# Patient Record
Sex: FEMALE | Race: WHITE | Employment: OTHER | URBAN - METROPOLITAN AREA
[De-identification: names, ages, dates, MRNs, and addresses within clinical notes are randomized per-mention and may not be internally consistent; named-entity substitution may affect disease eponyms.]

---

## 2017-01-26 ENCOUNTER — APPOINTMENT (OUTPATIENT)
Dept: LAB | Facility: HOSPITAL | Age: 67
End: 2017-01-26
Attending: INTERNAL MEDICINE
Payer: MEDICARE

## 2017-01-26 ENCOUNTER — OFFICE VISIT (OUTPATIENT)
Dept: INTERNAL MEDICINE CLINIC | Facility: CLINIC | Age: 67
End: 2017-01-26

## 2017-01-26 VITALS
BODY MASS INDEX: 27.14 KG/M2 | TEMPERATURE: 98 F | RESPIRATION RATE: 18 BRPM | SYSTOLIC BLOOD PRESSURE: 94 MMHG | WEIGHT: 168.88 LBS | HEART RATE: 71 BPM | DIASTOLIC BLOOD PRESSURE: 54 MMHG | OXYGEN SATURATION: 97 % | HEIGHT: 66 IN

## 2017-01-26 DIAGNOSIS — E78.2 MIXED HYPERLIPIDEMIA: ICD-10-CM

## 2017-01-26 DIAGNOSIS — E78.2 MIXED HYPERLIPIDEMIA: Primary | ICD-10-CM

## 2017-01-26 DIAGNOSIS — K21.00 GASTROESOPHAGEAL REFLUX DISEASE WITH ESOPHAGITIS: ICD-10-CM

## 2017-01-26 DIAGNOSIS — M81.0 OSTEOPOROSIS: ICD-10-CM

## 2017-01-26 LAB
ALBUMIN SERPL BCP-MCNC: 4.4 G/DL (ref 3.5–4.8)
ALBUMIN/GLOB SERPL: 1.4 {RATIO} (ref 1–2)
ALP SERPL-CCNC: 90 U/L (ref 32–100)
ALT SERPL-CCNC: 29 U/L (ref 14–54)
ANION GAP SERPL CALC-SCNC: 8 MMOL/L (ref 0–18)
AST SERPL-CCNC: 25 U/L (ref 15–41)
BILIRUB SERPL-MCNC: 0.6 MG/DL (ref 0.3–1.2)
BUN SERPL-MCNC: 14 MG/DL (ref 8–20)
BUN/CREAT SERPL: 14.6 (ref 10–20)
CALCIUM SERPL-MCNC: 9.6 MG/DL (ref 8.5–10.5)
CHLORIDE SERPL-SCNC: 100 MMOL/L (ref 95–110)
CO2 SERPL-SCNC: 31 MMOL/L (ref 22–32)
CREAT SERPL-MCNC: 0.96 MG/DL (ref 0.5–1.5)
GLOBULIN PLAS-MCNC: 3.1 G/DL (ref 2.5–3.7)
GLUCOSE SERPL-MCNC: 97 MG/DL (ref 70–99)
OSMOLALITY UR CALC.SUM OF ELEC: 288 MOSM/KG (ref 275–295)
POTASSIUM SERPL-SCNC: 4 MMOL/L (ref 3.3–5.1)
PROT SERPL-MCNC: 7.5 G/DL (ref 5.9–8.4)
SODIUM SERPL-SCNC: 139 MMOL/L (ref 136–144)

## 2017-01-26 PROCEDURE — G0463 HOSPITAL OUTPT CLINIC VISIT: HCPCS | Performed by: INTERNAL MEDICINE

## 2017-01-26 PROCEDURE — 36415 COLL VENOUS BLD VENIPUNCTURE: CPT

## 2017-01-26 PROCEDURE — 99214 OFFICE O/P EST MOD 30 MIN: CPT | Performed by: INTERNAL MEDICINE

## 2017-01-26 PROCEDURE — 80053 COMPREHEN METABOLIC PANEL: CPT

## 2017-01-26 RX ORDER — SIMVASTATIN 10 MG
TABLET ORAL
Qty: 90 TABLET | Refills: 1 | Status: SHIPPED | OUTPATIENT
Start: 2017-01-26 | End: 2017-07-27

## 2017-01-26 RX ORDER — OMEPRAZOLE 40 MG/1
CAPSULE, DELAYED RELEASE ORAL
Qty: 90 CAPSULE | Refills: 1 | Status: SHIPPED | OUTPATIENT
Start: 2017-01-26 | End: 2017-07-27

## 2017-01-26 NOTE — PATIENT INSTRUCTIONS
Problem List Items Addressed This Visit        Unprioritized    Esophageal reflux     Continue on omeprazole 40 mg 1 capsule once daily and will follow up. She does not have any symptoms of reflux or difficulty swallowing at this time.   Will consider a ga

## 2017-01-26 NOTE — ASSESSMENT & PLAN NOTE
Lipid panel and liver functions have been stable. Patient has tolerated his simvastatin at 10 mg 1 tablet once daily. Refills on medications have been sent in. Recheck labs today for the liver function tests and will follow-up in about 6 months.

## 2017-01-26 NOTE — ASSESSMENT & PLAN NOTE
Continue on omeprazole 40 mg 1 capsule once daily and will follow up. She does not have any symptoms of reflux or difficulty swallowing at this time. Will consider a gastroenterology evaluation next year.

## 2017-01-26 NOTE — PROGRESS NOTES
HPI:    Patient ID: Lizbeth Riddle is a 73year old female. HPI Comments: Dexa scan pending,pt has not been able to complete as  passed away in November. Hyperlipidemia  This is a chronic problem.  The current episode started more than 1 year ago Allergic/Immunologic: Negative. Neurological: Negative. Negative for focal weakness. Hematological: Negative. Psychiatric/Behavioral: Negative.                Current Outpatient Prescriptions:  simvastatin 10 MG Oral Tab TAKE 1 TABLET BY MOUTH NI Coordination normal.   Skin: No rash noted. No erythema. Psychiatric: She has a normal mood and affect. Her behavior is normal. Thought content normal.   Nursing note and vitals reviewed.              ASSESSMENT/PLAN:   Mixed hyperlipidemia  (primary enco

## 2017-07-11 ENCOUNTER — TELEPHONE (OUTPATIENT)
Dept: FAMILY MEDICINE CLINIC | Facility: CLINIC | Age: 67
End: 2017-07-11

## 2017-07-11 DIAGNOSIS — M81.0 OSTEOPOROSIS, UNSPECIFIED OSTEOPOROSIS TYPE, UNSPECIFIED PATHOLOGICAL FRACTURE PRESENCE: Primary | ICD-10-CM

## 2017-07-11 DIAGNOSIS — Z78.0 MENOPAUSE: ICD-10-CM

## 2017-07-11 NOTE — TELEPHONE ENCOUNTER
Nae Lujan is calling from [facility] state that pt need a new order for for  Dexa state that the DX code that was provided did not pass medicare neccessity

## 2017-07-17 ENCOUNTER — HOSPITAL ENCOUNTER (OUTPATIENT)
Dept: BONE DENSITY | Facility: HOSPITAL | Age: 67
Discharge: HOME | End: 2017-07-17
Attending: INTERNAL MEDICINE
Payer: MEDICARE

## 2017-07-17 ENCOUNTER — APPOINTMENT (OUTPATIENT)
Dept: LAB | Facility: HOSPITAL | Age: 67
End: 2017-07-17
Attending: INTERNAL MEDICINE
Payer: MEDICARE

## 2017-07-17 DIAGNOSIS — E78.2 MIXED HYPERLIPIDEMIA: ICD-10-CM

## 2017-07-17 DIAGNOSIS — Z78.0 MENOPAUSE: ICD-10-CM

## 2017-07-17 DIAGNOSIS — M81.0 OSTEOPOROSIS: ICD-10-CM

## 2017-07-17 LAB
ALBUMIN SERPL BCP-MCNC: 4 G/DL (ref 3.5–4.8)
ALP SERPL-CCNC: 65 U/L (ref 32–100)
ALT SERPL-CCNC: 17 U/L (ref 14–54)
AST SERPL-CCNC: 22 U/L (ref 15–41)
BILIRUB DIRECT SERPL-MCNC: 0.1 MG/DL (ref 0–0.2)
BILIRUB SERPL-MCNC: 0.8 MG/DL (ref 0.3–1.2)
CHOLEST SERPL-MCNC: 145 MG/DL (ref 110–200)
HDLC SERPL-MCNC: 53 MG/DL
LDLC SERPL CALC-MCNC: 77 MG/DL (ref 0–99)
NONHDLC SERPL-MCNC: 92 MG/DL
PROT SERPL-MCNC: 6.7 G/DL (ref 5.9–8.4)
TRIGL SERPL-MCNC: 73 MG/DL (ref 1–149)
TSH SERPL-ACNC: 1.4 UIU/ML (ref 0.45–5.33)

## 2017-07-17 PROCEDURE — 80076 HEPATIC FUNCTION PANEL: CPT

## 2017-07-17 PROCEDURE — 82306 VITAMIN D 25 HYDROXY: CPT

## 2017-07-17 PROCEDURE — 77080 DXA BONE DENSITY AXIAL: CPT | Performed by: INTERNAL MEDICINE

## 2017-07-17 PROCEDURE — 84443 ASSAY THYROID STIM HORMONE: CPT

## 2017-07-17 PROCEDURE — 80061 LIPID PANEL: CPT

## 2017-07-17 PROCEDURE — 36415 COLL VENOUS BLD VENIPUNCTURE: CPT

## 2017-07-19 LAB — 25(OH)D3 SERPL-MCNC: 38 NG/ML

## 2017-07-27 ENCOUNTER — OFFICE VISIT (OUTPATIENT)
Dept: INTERNAL MEDICINE CLINIC | Facility: CLINIC | Age: 67
End: 2017-07-27

## 2017-07-27 VITALS
RESPIRATION RATE: 20 BRPM | SYSTOLIC BLOOD PRESSURE: 110 MMHG | TEMPERATURE: 98 F | DIASTOLIC BLOOD PRESSURE: 69 MMHG | HEIGHT: 66 IN | HEART RATE: 61 BPM | BODY MASS INDEX: 25.02 KG/M2 | WEIGHT: 155.69 LBS

## 2017-07-27 DIAGNOSIS — K21.00 GASTROESOPHAGEAL REFLUX DISEASE WITH ESOPHAGITIS: ICD-10-CM

## 2017-07-27 DIAGNOSIS — M81.0 AGE-RELATED OSTEOPOROSIS WITHOUT CURRENT PATHOLOGICAL FRACTURE: Primary | ICD-10-CM

## 2017-07-27 DIAGNOSIS — E78.2 MIXED HYPERLIPIDEMIA: ICD-10-CM

## 2017-07-27 PROCEDURE — 99214 OFFICE O/P EST MOD 30 MIN: CPT | Performed by: INTERNAL MEDICINE

## 2017-07-27 PROCEDURE — G0463 HOSPITAL OUTPT CLINIC VISIT: HCPCS | Performed by: INTERNAL MEDICINE

## 2017-07-27 RX ORDER — OMEPRAZOLE 40 MG/1
CAPSULE, DELAYED RELEASE ORAL
Qty: 90 CAPSULE | Refills: 1 | Status: SHIPPED | OUTPATIENT
Start: 2017-07-27 | End: 2018-03-05

## 2017-07-27 RX ORDER — SIMVASTATIN 10 MG
TABLET ORAL
Qty: 90 TABLET | Refills: 1 | Status: SHIPPED | OUTPATIENT
Start: 2017-07-27 | End: 2018-03-05

## 2017-07-27 NOTE — ASSESSMENT & PLAN NOTE
DEXA scan shows significant loss of bone in the spine compared to the hips. Vertebral T-scores are at -3.1. This does suggest osteoporosis. Discussed need to start on medications–either a bisphosphonate or Prolia.   Given the history of chronic reflux an

## 2017-07-27 NOTE — ASSESSMENT & PLAN NOTE
Lipid panel has been remained stable. Liver functions recently rechecked has been normal.  Patient has tolerated simvastatin at 10 mg 1 tablet once daily. Continue the same dose of medications and will recheck labs in about 6 months .

## 2017-07-27 NOTE — PATIENT INSTRUCTIONS
Problem List Items Addressed This Visit        Unprioritized    Esophageal reflux     History of chronic gastroesophageal reflux disease which has been stable on omeprazole at 40 mg 1 capsule once daily.   She has had a history of dysphagia due to a Schatzk

## 2017-07-27 NOTE — PROGRESS NOTES
HPI:    Patient ID: Андрей Tristan is a 78year old female. LEFT FEMORAL NECK                         BMD:              0.612 gm/sq.  cm.                      T SCORE:                   -2.1                 Change since previous:            7.6% decrease Known Allergies    Blood pressure 110/69, pulse 61, temperature 97.9 °F (36.6 °C), temperature source Oral, resp. rate 20, height 5' 6\" (1.676 m), weight 155 lb 11.2 oz (70.6 kg), not currently breastfeeding. Body mass index is 25.13 kg/m².    PHYSICAL E gastroesophageal reflux disease which has been stable on omeprazole at 40 mg 1 capsule once daily. She has had a history of dysphagia due to a Schatzki's ring which is status post dilation and has been doing better.   She will be due for a gastroenterology DAY BEFORE A MEAL           Imaging & Referrals:  None       TD#[id number]

## 2017-07-27 NOTE — ASSESSMENT & PLAN NOTE
History of chronic gastroesophageal reflux disease which has been stable on omeprazole at 40 mg 1 capsule once daily. She has had a history of dysphagia due to a Schatzki's ring which is status post dilation and has been doing better.   She will be due for

## 2017-10-24 ENCOUNTER — TELEPHONE (OUTPATIENT)
Dept: INTERNAL MEDICINE CLINIC | Facility: CLINIC | Age: 67
End: 2017-10-24

## 2017-10-24 NOTE — TELEPHONE ENCOUNTER
PATIENT STATES THE OFFICE  WAS SENDING PRIOR AUTHORIZATION TO MEDICARE FOR PROLIA INJECTION. SHE HAS NOT HEARD ANYTHING BACK REGARDING THIS.

## 2017-10-24 NOTE — TELEPHONE ENCOUNTER
Prolia verification forms completed and faxed to Jacki Wallace at [phone]. Response time 1-7 business days.

## 2017-10-31 NOTE — TELEPHONE ENCOUNTER
Prolia Injection Coverage Summary received from [employer]. Pt has Medicare and [employer]. Prolia is covered by Medicare and and part B co-insurance.

## 2017-10-31 NOTE — TELEPHONE ENCOUNTER
Called [facility] and confirmed Prolia is in stock.  Contacted pt, instructed her to call to schedule a nurse visit at [facility]  To receive Prolia In.

## 2017-11-08 ENCOUNTER — NURSE ONLY (OUTPATIENT)
Dept: INTERNAL MEDICINE CLINIC | Facility: CLINIC | Age: 67
End: 2017-11-08

## 2017-11-08 DIAGNOSIS — M81.0 AGE-RELATED OSTEOPOROSIS WITHOUT CURRENT PATHOLOGICAL FRACTURE: Primary | ICD-10-CM

## 2017-11-08 PROCEDURE — 96372 THER/PROPH/DIAG INJ SC/IM: CPT | Performed by: INTERNAL MEDICINE

## 2017-11-08 NOTE — PROGRESS NOTES
Patient here for Prolia injection name and birth date confirmed. Order in chart as well as authorization from her insurance. Injection given subcutaneous in right arm. Patient tolerated it well.  Patient stated she is to return in 6 months for thenext injec

## 2018-03-05 DIAGNOSIS — E78.2 MIXED HYPERLIPIDEMIA: ICD-10-CM

## 2018-03-05 DIAGNOSIS — K21.00 GASTROESOPHAGEAL REFLUX DISEASE WITH ESOPHAGITIS: ICD-10-CM

## 2018-03-05 RX ORDER — SIMVASTATIN 10 MG
TABLET ORAL
Qty: 90 TABLET | Refills: 1 | Status: SHIPPED | OUTPATIENT
Start: 2018-03-05 | End: 2018-08-27

## 2018-03-05 RX ORDER — OMEPRAZOLE 40 MG/1
CAPSULE, DELAYED RELEASE ORAL
Qty: 90 CAPSULE | Refills: 1 | Status: SHIPPED | OUTPATIENT
Start: 2018-03-05 | End: 2018-08-27

## 2018-04-26 ENCOUNTER — TELEPHONE (OUTPATIENT)
Dept: INTERNAL MEDICINE CLINIC | Facility: CLINIC | Age: 68
End: 2018-04-26

## 2018-04-26 NOTE — TELEPHONE ENCOUNTER
Pt said will be due May for 2nd Prolia injection    Asking if can have done at 6/6 appt with Dr Swapnil Javier?     or does she need to have sooner

## 2018-05-08 ENCOUNTER — TELEPHONE (OUTPATIENT)
Dept: INTERNAL MEDICINE CLINIC | Facility: CLINIC | Age: 68
End: 2018-05-08

## 2018-05-08 NOTE — TELEPHONE ENCOUNTER
p/t needs an order for bone shot. (prosail shot) last one she had was in November. She said she is due for one.  Please notify when she can schedule that appt

## 2018-05-09 NOTE — TELEPHONE ENCOUNTER
Please advise on message. LOV 7/27/2017. Last Prolia 11/2017. Pt. Is due for another shot and it is authorized. OK to give. ?

## 2018-05-10 ENCOUNTER — TELEPHONE (OUTPATIENT)
Dept: OTHER | Age: 68
End: 2018-05-10

## 2018-05-10 NOTE — TELEPHONE ENCOUNTER
[address] [place] [address] spoke with rep Rad[employer] to verify summary of benefits for Prolia. Rep is running a verification today response time up to 48 hours.

## 2018-05-10 NOTE — TELEPHONE ENCOUNTER
Pt was called back. Prolia scheduled for 05/11 at [facility].

## 2018-05-11 ENCOUNTER — NURSE ONLY (OUTPATIENT)
Dept: INTERNAL MEDICINE CLINIC | Facility: CLINIC | Age: 68
End: 2018-05-11

## 2018-05-11 DIAGNOSIS — Z29.8 OSTEOPOROSIS PROPHYLAXIS: Primary | ICD-10-CM

## 2018-05-11 PROCEDURE — 96372 THER/PROPH/DIAG INJ SC/IM: CPT | Performed by: INTERNAL MEDICINE

## 2018-05-11 NOTE — TELEPHONE ENCOUNTER
Prolia summary of benefits received, medication is covered. [employer] subject to a $183 deductible and a 20% co-insurance for the administration and cost of Prolia.  Called patient left message regarding approval.

## 2018-05-11 NOTE — PROGRESS NOTES
Pt. Hope Vizcarra in for her Prolia inj. Received phone call from Evan Noonan that it was approved and Pt. Would pay $183. Name,  and allergies verified. Given in rt. Upper arm sub cut.  Wrote down date for next one which would be 2018 and mentioned to

## 2018-06-06 ENCOUNTER — LAB ENCOUNTER (OUTPATIENT)
Dept: LAB | Facility: HOSPITAL | Age: 68
End: 2018-06-06
Attending: INTERNAL MEDICINE
Payer: MEDICARE

## 2018-06-06 ENCOUNTER — OFFICE VISIT (OUTPATIENT)
Dept: INTERNAL MEDICINE CLINIC | Facility: CLINIC | Age: 68
End: 2018-06-06

## 2018-06-06 VITALS
DIASTOLIC BLOOD PRESSURE: 66 MMHG | BODY MASS INDEX: 25.55 KG/M2 | HEART RATE: 73 BPM | SYSTOLIC BLOOD PRESSURE: 103 MMHG | HEIGHT: 66 IN | WEIGHT: 159 LBS | RESPIRATION RATE: 20 BRPM

## 2018-06-06 DIAGNOSIS — M81.0 AGE-RELATED OSTEOPOROSIS WITHOUT CURRENT PATHOLOGICAL FRACTURE: ICD-10-CM

## 2018-06-06 DIAGNOSIS — E78.2 MIXED HYPERLIPIDEMIA: ICD-10-CM

## 2018-06-06 DIAGNOSIS — E78.2 MIXED HYPERLIPIDEMIA: Primary | ICD-10-CM

## 2018-06-06 DIAGNOSIS — E04.9 GOITER: ICD-10-CM

## 2018-06-06 PROCEDURE — 36415 COLL VENOUS BLD VENIPUNCTURE: CPT

## 2018-06-06 PROCEDURE — 99214 OFFICE O/P EST MOD 30 MIN: CPT | Performed by: INTERNAL MEDICINE

## 2018-06-06 PROCEDURE — G0463 HOSPITAL OUTPT CLINIC VISIT: HCPCS | Performed by: INTERNAL MEDICINE

## 2018-06-06 PROCEDURE — 80061 LIPID PANEL: CPT

## 2018-06-06 PROCEDURE — 85025 COMPLETE CBC W/AUTO DIFF WBC: CPT

## 2018-06-06 PROCEDURE — 84443 ASSAY THYROID STIM HORMONE: CPT

## 2018-06-06 PROCEDURE — 80053 COMPREHEN METABOLIC PANEL: CPT

## 2018-06-06 NOTE — ASSESSMENT & PLAN NOTE
Patient's last DEXA scan done in 2017 showed vertebral T-scores at -3.1. Patient does have a history of chronic reflux and a Schatzki's ring. She has needed dilation in the past for dysphagia.   She was started on Prolia about a year and a half ago, she h

## 2018-06-06 NOTE — ASSESSMENT & PLAN NOTE
Panel and liver function tests have been stable in the past.  She is overdue for labs which have been ordered but not completed. Reprinted orders and have advised patient to get these completed ASAP.   Continue on simvastatin at 10 mg 1 tablet once daily a

## 2018-06-06 NOTE — ASSESSMENT & PLAN NOTE
History of a goiter with multiple subcentimeter nodules. Last ultrasound was done in 2013. Recheck has been ordered.

## 2018-06-06 NOTE — PATIENT INSTRUCTIONS
Problem List Items Addressed This Visit        Unprioritized    Goiter     History of a goiter with multiple subcentimeter nodules. Last ultrasound was done in 2013. Recheck has been ordered.          Relevant Orders    ASSAY, THYROID STIM HORMONE    Hype

## 2018-06-06 NOTE — PROGRESS NOTES
HPI:    Patient ID: Noam Liang is a 80year old female. On Prolia-last injection taken in May 2018. Next due in November 2018    She refuses mammograms. Hyperlipidemia   This is a chronic problem.  The current episode started more than 1 year ago External ear normal.   Nose: Nose normal.   Mouth/Throat: Oropharynx is clear and moist. No oropharyngeal exudate. Eyes: Conjunctivae and EOM are normal. Pupils are equal, round, and reactive to light. Right eye exhibits no discharge.  Left eye exhibits n vertebral T-scores at -3.1. Patient does have a history of chronic reflux and a Schatzki's ring. She has needed dilation in the past for dysphagia. She was started on Prolia about a year and a half ago, she has tolerated this well.   Her last shot was on

## 2018-08-27 DIAGNOSIS — K21.00 GASTROESOPHAGEAL REFLUX DISEASE WITH ESOPHAGITIS: ICD-10-CM

## 2018-08-27 DIAGNOSIS — E78.2 MIXED HYPERLIPIDEMIA: ICD-10-CM

## 2018-08-27 RX ORDER — SIMVASTATIN 10 MG
TABLET ORAL
Qty: 90 TABLET | Refills: 1 | Status: SHIPPED | OUTPATIENT
Start: 2018-08-27 | End: 2019-03-03

## 2018-08-27 RX ORDER — OMEPRAZOLE 40 MG/1
CAPSULE, DELAYED RELEASE ORAL
Qty: 90 CAPSULE | Refills: 1 | Status: SHIPPED | OUTPATIENT
Start: 2018-08-27 | End: 2019-03-03

## 2018-08-28 NOTE — TELEPHONE ENCOUNTER
Cholesterol Medications  Protocol Criteria:  · Appointment scheduled in the past 12 months or in the next 3 months  · ALT & LDL on file in the past 12 months  · ALT result < 80  · LDL result <130   Recent Outpatient Visits            2 months ago Mixed hyp [facility], [facility], [address] 5 month f/u

## 2018-10-15 ENCOUNTER — TELEPHONE (OUTPATIENT)
Dept: INTERNAL MEDICINE CLINIC | Facility: CLINIC | Age: 68
End: 2018-10-15

## 2018-10-15 NOTE — TELEPHONE ENCOUNTER
Patient reports was seen in the [facility] ED 10/14/18 for an infected blister on left heel. Had for several days prior, yesterday woke up with pain, swelling, redness and puss to the blister. Was prescribed Cephalexin 500mg 4xs daily for 7 days.  Reported

## 2018-10-15 NOTE — TELEPHONE ENCOUNTER
Advised patient on Dr. Navid Mix information and recommendation. Patient verbalized understanding. Advised to call back if needed.

## 2018-11-05 ENCOUNTER — LAB ENCOUNTER (OUTPATIENT)
Dept: LAB | Facility: HOSPITAL | Age: 68
End: 2018-11-05
Attending: INTERNAL MEDICINE
Payer: MEDICARE

## 2018-11-05 DIAGNOSIS — E78.2 MIXED HYPERLIPIDEMIA: ICD-10-CM

## 2018-11-05 DIAGNOSIS — M81.0 AGE-RELATED OSTEOPOROSIS WITHOUT CURRENT PATHOLOGICAL FRACTURE: ICD-10-CM

## 2018-11-05 DIAGNOSIS — E04.9 GOITER: ICD-10-CM

## 2018-11-05 PROCEDURE — 80061 LIPID PANEL: CPT

## 2018-11-05 PROCEDURE — 36415 COLL VENOUS BLD VENIPUNCTURE: CPT

## 2018-11-05 PROCEDURE — 80053 COMPREHEN METABOLIC PANEL: CPT

## 2018-11-05 PROCEDURE — 85025 COMPLETE CBC W/AUTO DIFF WBC: CPT

## 2018-11-05 PROCEDURE — 84443 ASSAY THYROID STIM HORMONE: CPT

## 2018-11-05 PROCEDURE — 82306 VITAMIN D 25 HYDROXY: CPT

## 2018-11-13 ENCOUNTER — OFFICE VISIT (OUTPATIENT)
Dept: INTERNAL MEDICINE CLINIC | Facility: CLINIC | Age: 68
End: 2018-11-13
Payer: MEDICARE

## 2018-11-13 VITALS
HEART RATE: 74 BPM | BODY MASS INDEX: 25.71 KG/M2 | HEIGHT: 66 IN | WEIGHT: 160 LBS | RESPIRATION RATE: 18 BRPM | DIASTOLIC BLOOD PRESSURE: 74 MMHG | SYSTOLIC BLOOD PRESSURE: 116 MMHG

## 2018-11-13 DIAGNOSIS — E55.9 VITAMIN D DEFICIENCY: ICD-10-CM

## 2018-11-13 DIAGNOSIS — E78.2 MIXED HYPERLIPIDEMIA: Primary | ICD-10-CM

## 2018-11-13 DIAGNOSIS — K21.00 GASTROESOPHAGEAL REFLUX DISEASE WITH ESOPHAGITIS: ICD-10-CM

## 2018-11-13 DIAGNOSIS — M81.0 AGE-RELATED OSTEOPOROSIS WITHOUT CURRENT PATHOLOGICAL FRACTURE: ICD-10-CM

## 2018-11-13 PROCEDURE — G0463 HOSPITAL OUTPT CLINIC VISIT: HCPCS | Performed by: INTERNAL MEDICINE

## 2018-11-13 PROCEDURE — 99214 OFFICE O/P EST MOD 30 MIN: CPT | Performed by: INTERNAL MEDICINE

## 2018-11-13 NOTE — ASSESSMENT & PLAN NOTE
Panel and liver function tests have been stable. She has been on simvastatin at 10 mg daily which she has tolerated well. Continue the same dose of medications.

## 2018-11-13 NOTE — ASSESSMENT & PLAN NOTE
Last DEXA scan done in 2017 showed T-scores of -3.1. She is intolerant of bisphosphonates due to the presence of the Schatzki's ring and chronic reflux.   She has been on Prolia and is currently due for an injection but unable to get it today as has not be

## 2018-11-13 NOTE — PATIENT INSTRUCTIONS
Problem List Items Addressed This Visit        Unprioritized    Esophageal reflux     History of chronic gastroesophageal reflux disease and has been stable on omeprazole at 40 mg 1 capsule daily.   She does have a history of a Schatzki's ring and dysphagia

## 2018-11-13 NOTE — ASSESSMENT & PLAN NOTE
History of chronic gastroesophageal reflux disease and has been stable on omeprazole at 40 mg 1 capsule daily. She does have a history of a Schatzki's ring and dysphagia but is doing well since dilation.   Continue on the same medications and follow-up on

## 2018-11-13 NOTE — ASSESSMENT & PLAN NOTE
Vitamin D levels look normal at this time. Advised to continue on over-the-counter vitamin D at 2000 units daily.

## 2018-11-19 ENCOUNTER — TELEPHONE (OUTPATIENT)
Dept: INTERNAL MEDICINE CLINIC | Facility: CLINIC | Age: 68
End: 2018-11-19

## 2018-11-19 NOTE — TELEPHONE ENCOUNTER
Dr Jerry Kaur, please advise. See below, note from May visit--do you want patient to get Prolia, do you want PA? Progress Notes   Margareth Russell LPN (Licensed Practical Nurse)      Pt. Came in for her Prolia inj.   Received phone call from Leah Head that

## 2018-11-19 NOTE — PROGRESS NOTES
HPI:    Patient ID: Merritt Saeed is a 80year old female. Labs discussed      Gastro-esophageal Reflux   She reports no abdominal pain, no belching, no dysphagia, no heartburn, no hoarse voice, no nausea or no sore throat. This is a chronic problem.  The Gastrointestinal: Negative. Negative for abdominal pain, dysphagia, heartburn and nausea. Endocrine: Negative. Genitourinary: Negative. Musculoskeletal: Negative. Negative for muscle weakness. Skin: Negative.     Allergic/Immunologic: Negative reflexes. No cranial nerve deficit. She exhibits normal muscle tone. Coordination normal.   Skin: No rash noted. No erythema. Psychiatric: She has a normal mood and affect.  Her behavior is normal. Thought content normal.   Nursing note and vitals reviewe Referrals:  None       OF#[id number]

## 2018-11-20 NOTE — TELEPHONE ENCOUNTER
This is from my note 11/2018   Osteoporosis       Last DEXA scan done in 2017 showed T-scores of -3.1. She is intolerant of bisphosphonates due to the presence of the Schatzki's ring and chronic reflux.   She has been on Prolia and is currently due for an

## 2018-11-21 NOTE — TELEPHONE ENCOUNTER
Contacted [employer] to verify benefits for Prolia spoke with rep Tawny Harrell. Response time up to 48 hours.

## 2018-11-26 NOTE — TELEPHONE ENCOUNTER
Patient calling to follow up because she stated she was advised by Dr. Adi Suarez that it is urgent she get this injection.

## 2018-11-28 NOTE — TELEPHONE ENCOUNTER
Spoke with patient and informed we are still waiting for the benefit verification from Jacki Wallace and as soon as we get notification someone from our clinical staff will contact her to schedule the appointment.

## 2018-11-28 NOTE — TELEPHONE ENCOUNTER
Contacted [employer] to check status of benefit verification spoke with rep Estela. Verification is still in process rep will send fax once complete.

## 2018-12-01 NOTE — TELEPHONE ENCOUNTER
Summary of benefits received Prolia is available to patient. Benefits subject to a $183 deductible ($183 met) and 20% co-insurance for the administration and cost of Prolia. Patient can be scheduled for the Prolia injection.

## 2018-12-03 NOTE — TELEPHONE ENCOUNTER
Any specific date to give to pt for an appt? Due to Prolia is given every 6 mos only date to date.  Thanks!!!

## 2018-12-03 NOTE — TELEPHONE ENCOUNTER
Patient calling back and  requesting clarification regarding having to pay for the shot?  Patient states she will not be able to pay for the injection and has not paid for it in the past.     Kacy Fairchild, can you clarify if the 20% noted below means the patient

## 2018-12-04 NOTE — TELEPHONE ENCOUNTER
Spoke with patient and she states she has a new secondary insurance. Advised patient PA nurse would contact [place] [phone] and update the secondary insurance information and contact patient once notification received.      Spoke with Jarrod Shi at [place] [phone], pr

## 2018-12-18 NOTE — TELEPHONE ENCOUNTER
Called patient LMTCB. Patient's secondary insurance is active, there is no deductible for the Prolia the estimated out of pocket is $0. Patient can be scheduled for her Prolia injection.

## 2018-12-18 NOTE — TELEPHONE ENCOUNTER
Pt calling back and advised ok to schedule Prolia. Pt verb understanding and transferred to CSS to schedule nurse visit.

## 2018-12-20 ENCOUNTER — NURSE ONLY (OUTPATIENT)
Dept: INTERNAL MEDICINE CLINIC | Facility: CLINIC | Age: 68
End: 2018-12-20
Payer: MEDICARE

## 2018-12-20 DIAGNOSIS — M81.0 AGE-RELATED OSTEOPOROSIS WITHOUT CURRENT PATHOLOGICAL FRACTURE: Primary | ICD-10-CM

## 2018-12-20 PROCEDURE — 96372 THER/PROPH/DIAG INJ SC/IM: CPT | Performed by: INTERNAL MEDICINE

## 2018-12-20 NOTE — PROGRESS NOTES
Pt was here today for scheduled nurse visit. Pt name and  verified along with Allergies. Pt was here for her scheduled Prolia injection, last injection was on 2018 and the authorization was approved for the  next injection on 2018.  Pt

## 2019-03-03 DIAGNOSIS — E78.2 MIXED HYPERLIPIDEMIA: ICD-10-CM

## 2019-03-03 DIAGNOSIS — K21.00 GASTROESOPHAGEAL REFLUX DISEASE WITH ESOPHAGITIS: ICD-10-CM

## 2019-03-04 RX ORDER — SIMVASTATIN 10 MG
TABLET ORAL
Qty: 90 TABLET | Refills: 0 | Status: SHIPPED | OUTPATIENT
Start: 2019-03-04 | End: 2019-05-31

## 2019-03-04 RX ORDER — OMEPRAZOLE 40 MG/1
CAPSULE, DELAYED RELEASE ORAL
Qty: 90 CAPSULE | Refills: 0 | Status: SHIPPED | OUTPATIENT
Start: 2019-03-04 | End: 2019-05-31

## 2019-05-21 ENCOUNTER — TELEPHONE (OUTPATIENT)
Dept: INTERNAL MEDICINE CLINIC | Facility: CLINIC | Age: 69
End: 2019-05-21

## 2019-05-21 NOTE — TELEPHONE ENCOUNTER
Patient calling requesting order for order for denosumab (PROLIA) 60 MG/ML injection 60 mg.     Per patient, she has an appointment with Dr. Billie Damon on 6/21/19 to received Prolia injection.      Patient stated she had a lot of issue in November in getting Pr

## 2019-05-30 NOTE — TELEPHONE ENCOUNTER
Summary of benefits received; Prolia is available. Benefits subject to a $185 deductible. Patient also has a Medicare Supplement Plan G that is active. Called patient LMTCB. Patient may schedule the injection.

## 2019-05-31 DIAGNOSIS — E78.2 MIXED HYPERLIPIDEMIA: ICD-10-CM

## 2019-05-31 DIAGNOSIS — K21.00 GASTROESOPHAGEAL REFLUX DISEASE WITH ESOPHAGITIS: ICD-10-CM

## 2019-05-31 RX ORDER — OMEPRAZOLE 40 MG/1
CAPSULE, DELAYED RELEASE ORAL
Qty: 90 CAPSULE | Refills: 1 | Status: SHIPPED | OUTPATIENT
Start: 2019-05-31 | End: 2019-11-25

## 2019-05-31 RX ORDER — SIMVASTATIN 10 MG
TABLET ORAL
Qty: 90 TABLET | Refills: 1 | Status: SHIPPED | OUTPATIENT
Start: 2019-05-31 | End: 2019-11-25

## 2019-05-31 NOTE — TELEPHONE ENCOUNTER
Pt notified [facility] for Simvastatin and Omeprazole was sent to [facility] . Pt expressed her appreciation. She will call pharmacy to verify if they receive the refill.

## 2019-05-31 NOTE — TELEPHONE ENCOUNTER
Refill passed per [facility], [facility] protocol. Pt called requesting refill for Simvastatin and Omeprazole. Pt is out of medications.      Cholesterol Medications (Simvastatin)  Protocol Criteria:  · Appointment scheduled in the past 12 months or in the next 3 pathological fracture    [facility], [facility], [address], [place]    Nurse Only        Future Appointments       Provider Department Appt Notes    In 3 weeks Brien Gerard MD [facility], [address], [place] F/U  [facility] INJ

## 2019-06-15 ENCOUNTER — LAB ENCOUNTER (OUTPATIENT)
Dept: LAB | Facility: HOSPITAL | Age: 69
End: 2019-06-15
Attending: INTERNAL MEDICINE
Payer: MEDICARE

## 2019-06-15 ENCOUNTER — TELEPHONE (OUTPATIENT)
Dept: INTERNAL MEDICINE CLINIC | Facility: CLINIC | Age: 69
End: 2019-06-15

## 2019-06-15 DIAGNOSIS — E78.2 MIXED HYPERLIPIDEMIA: ICD-10-CM

## 2019-06-15 DIAGNOSIS — E55.9 VITAMIN D DEFICIENCY: ICD-10-CM

## 2019-06-15 PROCEDURE — 36415 COLL VENOUS BLD VENIPUNCTURE: CPT

## 2019-06-15 PROCEDURE — 85025 COMPLETE CBC W/AUTO DIFF WBC: CPT

## 2019-06-15 PROCEDURE — 82306 VITAMIN D 25 HYDROXY: CPT

## 2019-06-15 PROCEDURE — 82607 VITAMIN B-12: CPT

## 2019-06-15 PROCEDURE — 80053 COMPREHEN METABOLIC PANEL: CPT

## 2019-06-15 PROCEDURE — 80061 LIPID PANEL: CPT

## 2019-06-15 NOTE — TELEPHONE ENCOUNTER
Contacted pt to reschedule from 6/21 due to a change in your schedule. Pt requested to come in before the end of the month, however there are no appts available. CINDY is it possible that pt can be seen before July for her Prolia injection?     Please reply

## 2019-06-26 ENCOUNTER — OFFICE VISIT (OUTPATIENT)
Dept: INTERNAL MEDICINE CLINIC | Facility: CLINIC | Age: 69
End: 2019-06-26
Payer: MEDICARE

## 2019-06-26 VITALS
WEIGHT: 145.69 LBS | RESPIRATION RATE: 20 BRPM | HEART RATE: 78 BPM | BODY MASS INDEX: 23.41 KG/M2 | SYSTOLIC BLOOD PRESSURE: 101 MMHG | HEIGHT: 66 IN | DIASTOLIC BLOOD PRESSURE: 51 MMHG | TEMPERATURE: 98 F

## 2019-06-26 DIAGNOSIS — E55.9 VITAMIN D DEFICIENCY: Primary | ICD-10-CM

## 2019-06-26 DIAGNOSIS — E78.2 MIXED HYPERLIPIDEMIA: ICD-10-CM

## 2019-06-26 DIAGNOSIS — E04.9 GOITER: ICD-10-CM

## 2019-06-26 DIAGNOSIS — M81.0 AGE-RELATED OSTEOPOROSIS WITHOUT CURRENT PATHOLOGICAL FRACTURE: ICD-10-CM

## 2019-06-26 PROCEDURE — 96372 THER/PROPH/DIAG INJ SC/IM: CPT | Performed by: INTERNAL MEDICINE

## 2019-06-26 PROCEDURE — 99214 OFFICE O/P EST MOD 30 MIN: CPT | Performed by: INTERNAL MEDICINE

## 2019-06-26 PROCEDURE — G0463 HOSPITAL OUTPT CLINIC VISIT: HCPCS | Performed by: INTERNAL MEDICINE

## 2019-06-26 RX ORDER — ERGOCALCIFEROL 1.25 MG/1
50000 CAPSULE ORAL WEEKLY
Qty: 12 CAPSULE | Refills: 1 | Status: SHIPPED | OUTPATIENT
Start: 2019-06-26 | End: 2019-07-26

## 2019-06-26 NOTE — ASSESSMENT & PLAN NOTE
Vitamin D levels are low and in the presence of osteoporosis will consider replacing with prescription strength vitamin D 50,000 units once a week for the next 6 months.   Would advised to continue on an over-the-counter vitamin D at 2000 units after comple

## 2019-06-26 NOTE — PROGRESS NOTES
HPI:    Patient ID: Liv Mock is a 77year old female.     Notes recorded by Joan Berumen MD on 6/17/2019 at 9:19 PM CDT  Vitamin D levels are slightly low-please advised to take an over-the-counter vitamin D at 2000 units daily  ------    Notes record Negative. Endocrine: Negative. Genitourinary: Negative. Musculoskeletal: Negative. Skin: Negative. Allergic/Immunologic: Negative. Neurological: Negative. Negative for focal weakness. Hematological: Negative.     Psychiatric/Behavioral: range of motion. She exhibits no edema or tenderness. Lymphadenopathy:     She has no cervical adenopathy. Neurological: She is alert and oriented to person, place, and time. She has normal reflexes. No cranial nerve deficit.  She exhibits normal muscle [E]      Lipid Panel [E]      TSH W Reflex To Free T4 [E]      Vitamin D, 25-Hydroxy [E]      Meds This Visit:  Requested Prescriptions     Signed Prescriptions Disp Refills   • ergocalciferol 86411 units Oral Cap 12 capsule 1     Sig: Take 1 capsule (50,0

## 2019-06-26 NOTE — ASSESSMENT & PLAN NOTE
Ultrasound of the thyroid last done in 2013. Continues to have a palpable nodular thyroid.   Will advised to repeat an ultrasound and follow-up after completion

## 2019-06-26 NOTE — ASSESSMENT & PLAN NOTE
DEXA scan done in July 2017 showed T-scores of -3.1. Advised to repeat the DEXA scan prior to the next office visit in November.

## 2019-06-26 NOTE — ASSESSMENT & PLAN NOTE
Lipid panel and liver function test have been stable on simvastatin at 10 mg daily. She has tolerated the medications well.   Continue the same dose of medication and recheck labs in about 6 months

## 2019-06-26 NOTE — PATIENT INSTRUCTIONS
Problem List Items Addressed This Visit        Unprioritized    Goiter     Ultrasound of the thyroid last done in 2013. Continues to have a palpable nodular thyroid.   Will advised to repeat an ultrasound and follow-up after completion         Relevant Ord

## 2019-11-25 DIAGNOSIS — E78.2 MIXED HYPERLIPIDEMIA: ICD-10-CM

## 2019-11-25 DIAGNOSIS — K21.00 GASTROESOPHAGEAL REFLUX DISEASE WITH ESOPHAGITIS: ICD-10-CM

## 2019-11-26 RX ORDER — SIMVASTATIN 10 MG
TABLET ORAL
Qty: 90 TABLET | Refills: 1 | Status: SHIPPED | OUTPATIENT
Start: 2019-11-26 | End: 2020-05-26

## 2019-11-26 RX ORDER — OMEPRAZOLE 40 MG/1
CAPSULE, DELAYED RELEASE ORAL
Qty: 90 CAPSULE | Refills: 1 | Status: SHIPPED | OUTPATIENT
Start: 2019-11-26 | End: 2020-05-26

## 2019-11-26 NOTE — TELEPHONE ENCOUNTER
Refill passed per [facility] protocol.   Cholesterol Medications  Protocol Criteria:  · Appointment scheduled in the past 12 months or in the next 3 months  · ALT & LDL on file in the past 12 months  · ALT result < 80  · LDL result <130   Recent Outpat Nurse Only        Future Appointments       Provider Department Appt Notes    In 2 weeks [facility]; [address]     In 2 weeks [address] [place] [address] Ultrasound in [place]     In 1 month Avril Tracy MD [address] [place] [address]

## 2019-11-29 ENCOUNTER — TELEPHONE (OUTPATIENT)
Dept: INTERNAL MEDICINE CLINIC | Facility: CLINIC | Age: 69
End: 2019-11-29

## 2019-11-29 NOTE — TELEPHONE ENCOUNTER
Prolia verification forms completed and faxed to Jacki Perez 50 [phone]. Response time 7-10 days.

## 2019-11-29 NOTE — TELEPHONE ENCOUNTER
Patient called because she gets a prolia shot for her bones. It is due next month. And she stated Dr. Matty Carranza nurse is suppose to call medicare or social security to get the okay.  She is requesting the nurse to call in to get approval.

## 2019-12-03 NOTE — TELEPHONE ENCOUNTER
CSS, please assist patient with scheduling an appt for prolia injection. Previous injection was administered 06/26/2019. Next dose is due on or after 12/26/2019. Summary of benefits received: Benefits subject to a $185 deductible.  Patient also has a Med

## 2019-12-16 ENCOUNTER — HOSPITAL ENCOUNTER (OUTPATIENT)
Dept: BONE DENSITY | Age: 69
Discharge: HOME | End: 2019-12-16
Attending: INTERNAL MEDICINE
Payer: MEDICARE

## 2019-12-16 ENCOUNTER — HOSPITAL ENCOUNTER (OUTPATIENT)
Dept: ULTRASOUND IMAGING | Age: 69
Discharge: HOME | End: 2019-12-16
Attending: INTERNAL MEDICINE
Payer: MEDICARE

## 2019-12-16 DIAGNOSIS — M81.0 AGE-RELATED OSTEOPOROSIS WITHOUT CURRENT PATHOLOGICAL FRACTURE: ICD-10-CM

## 2019-12-16 DIAGNOSIS — E04.9 GOITER: ICD-10-CM

## 2019-12-16 PROCEDURE — 76536 US EXAM OF HEAD AND NECK: CPT | Performed by: INTERNAL MEDICINE

## 2019-12-16 PROCEDURE — 77080 DXA BONE DENSITY AXIAL: CPT | Performed by: INTERNAL MEDICINE

## 2019-12-21 ENCOUNTER — APPOINTMENT (OUTPATIENT)
Dept: LAB | Facility: HOSPITAL | Age: 69
End: 2019-12-21
Attending: INTERNAL MEDICINE
Payer: MEDICARE

## 2019-12-21 DIAGNOSIS — E55.9 VITAMIN D DEFICIENCY: ICD-10-CM

## 2019-12-21 DIAGNOSIS — E78.2 MIXED HYPERLIPIDEMIA: ICD-10-CM

## 2019-12-21 PROCEDURE — 36415 COLL VENOUS BLD VENIPUNCTURE: CPT

## 2019-12-21 PROCEDURE — 82306 VITAMIN D 25 HYDROXY: CPT

## 2019-12-21 PROCEDURE — 80061 LIPID PANEL: CPT

## 2019-12-21 PROCEDURE — 84443 ASSAY THYROID STIM HORMONE: CPT

## 2019-12-21 PROCEDURE — 80053 COMPREHEN METABOLIC PANEL: CPT

## 2019-12-27 ENCOUNTER — OFFICE VISIT (OUTPATIENT)
Dept: INTERNAL MEDICINE CLINIC | Facility: CLINIC | Age: 69
End: 2019-12-27
Payer: MEDICARE

## 2019-12-27 VITALS
HEART RATE: 69 BPM | DIASTOLIC BLOOD PRESSURE: 81 MMHG | HEIGHT: 66 IN | WEIGHT: 154 LBS | SYSTOLIC BLOOD PRESSURE: 129 MMHG | BODY MASS INDEX: 24.75 KG/M2 | RESPIRATION RATE: 16 BRPM

## 2019-12-27 DIAGNOSIS — E04.9 GOITER: ICD-10-CM

## 2019-12-27 DIAGNOSIS — M54.31 SCIATIC PAIN, RIGHT: ICD-10-CM

## 2019-12-27 DIAGNOSIS — M81.0 AGE-RELATED OSTEOPOROSIS WITHOUT CURRENT PATHOLOGICAL FRACTURE: Primary | ICD-10-CM

## 2019-12-27 DIAGNOSIS — Z23 NEED FOR VACCINATION: ICD-10-CM

## 2019-12-27 DIAGNOSIS — E78.2 MIXED HYPERLIPIDEMIA: ICD-10-CM

## 2019-12-27 DIAGNOSIS — E55.9 VITAMIN D DEFICIENCY: ICD-10-CM

## 2019-12-27 DIAGNOSIS — K21.00 GASTROESOPHAGEAL REFLUX DISEASE WITH ESOPHAGITIS: ICD-10-CM

## 2019-12-27 PROCEDURE — 96372 THER/PROPH/DIAG INJ SC/IM: CPT | Performed by: INTERNAL MEDICINE

## 2019-12-27 PROCEDURE — 99214 OFFICE O/P EST MOD 30 MIN: CPT | Performed by: INTERNAL MEDICINE

## 2019-12-27 PROCEDURE — 90662 IIV NO PRSV INCREASED AG IM: CPT | Performed by: INTERNAL MEDICINE

## 2019-12-27 PROCEDURE — G0008 ADMIN INFLUENZA VIRUS VAC: HCPCS | Performed by: INTERNAL MEDICINE

## 2019-12-27 PROCEDURE — G0463 HOSPITAL OUTPT CLINIC VISIT: HCPCS | Performed by: INTERNAL MEDICINE

## 2019-12-27 NOTE — PROGRESS NOTES
I called pharmacy to see which pneumonia inj. Pt. Had and they said she had on 12/5/18 Prevnar 13. I updated chart and called pt.

## 2019-12-27 NOTE — PATIENT INSTRUCTIONS
Problem List Items Addressed This Visit        Unprioritized    Esophageal reflux     History of chronic GERD. Patient does have a history of dysphagia and Schatzki's ring which was dilated.   She has been doing well since with intermittent gastroesophagea worsening pain at which time further management changes will be made. Tylenol 650 mg 2-3 times a day if necessary for intermittent discomfort. Vitamin D deficiency - Primary     Vitamin D levels are well supplemented now.   May continue on an over-

## 2019-12-27 NOTE — ASSESSMENT & PLAN NOTE
Lipid panel and liver function test have been stable on simvastatin at 10 mg daily. She has tolerated his medications well. Continue the same dose of medication, recheck labs as ordered in 6 months.

## 2019-12-27 NOTE — PROGRESS NOTES
HPI:    Patient ID: Leonardo Gustafson is a 77year old female.     Notes recorded by Sloan Albert MD on 12/27/2019 at 7:42 AM CST  The blood sugars,calcium,electrolytes ,kidney and liver functions look normal.  The cholesterol panel looks normal.  The thyroid been on Prolia and is currently due for a shot. Prior authorization completed in November. Immunization History  Administered            Date(s) Administered    Influenza             12/19/2018       HENT: Negative. Eyes: Negative.     Respiratory: and breath sounds normal. She has no wheezes. She has no rales. She exhibits no tenderness. Abdominal: Soft. Bowel sounds are normal. She exhibits no distension and no mass. There is no tenderness. There is no rebound.  Musculoskeletal: Normal range of mo will continue on Prolia and follow-up in about 2 years with a DEXA scan. Relevant Medications    Denosumab (PROLIA) 60 MG/ML injection 60 mg (Completed)    Vitamin D deficiency     Vitamin D levels are well supplemented now.   May continue on an ove

## 2019-12-27 NOTE — ASSESSMENT & PLAN NOTE
Ultrasound of the thyroid looks stable with multiple small nodules. Thyroid function test look normal.  No specific abnormalities noted on palpation today. Follow-up labs in about 6 months.

## 2019-12-27 NOTE — ASSESSMENT & PLAN NOTE
History of chronic GERD. Patient does have a history of dysphagia and Schatzki's ring which was dilated. She has been doing well since with intermittent gastroesophageal reflux. She is advised to call if she has any further problems.

## 2019-12-27 NOTE — ASSESSMENT & PLAN NOTE
DEXA scan just completed showed improvement in the spine T-scores from -3.1 to -2. 6. Her last DEXA scan was completed in 2017 after which she was started on Prolia. Additionally she also had vitamin D deficiency which was supplemented.   Significant impro

## 2019-12-27 NOTE — ASSESSMENT & PLAN NOTE
Vitamin D levels are well supplemented now. May continue on an over-the-counter vitamin D 2000 units daily and recheck labs in 6 months. Orders provided.

## 2019-12-27 NOTE — ASSESSMENT & PLAN NOTE
Patient with a history of intermittent pain shooting down from the buttock to the back of the thigh for the past 4 to 6 weeks. Symptoms have gradually improved and she does notice it occasionally at this time.   She has not had any tingling and numbness in

## 2020-05-25 DIAGNOSIS — E78.2 MIXED HYPERLIPIDEMIA: ICD-10-CM

## 2020-05-25 DIAGNOSIS — K21.00 GASTROESOPHAGEAL REFLUX DISEASE WITH ESOPHAGITIS: ICD-10-CM

## 2020-05-26 DIAGNOSIS — E78.2 MIXED HYPERLIPIDEMIA: ICD-10-CM

## 2020-05-26 DIAGNOSIS — K21.00 GASTROESOPHAGEAL REFLUX DISEASE WITH ESOPHAGITIS: ICD-10-CM

## 2020-05-26 RX ORDER — SIMVASTATIN 10 MG
TABLET ORAL
Qty: 90 TABLET | Refills: 0 | Status: SHIPPED | OUTPATIENT
Start: 2020-05-26 | End: 2020-08-26

## 2020-05-26 RX ORDER — OMEPRAZOLE 40 MG/1
CAPSULE, DELAYED RELEASE ORAL
Qty: 90 CAPSULE | Refills: 0 | Status: SHIPPED | OUTPATIENT
Start: 2020-05-26 | End: 2020-08-26

## 2020-05-26 RX ORDER — SIMVASTATIN 10 MG
TABLET ORAL
Qty: 90 TABLET | Refills: 0 | Status: SHIPPED | OUTPATIENT
Start: 2020-05-26 | End: 2020-05-26

## 2020-05-26 RX ORDER — OMEPRAZOLE 40 MG/1
CAPSULE, DELAYED RELEASE ORAL
Qty: 90 CAPSULE | Refills: 0 | Status: SHIPPED | OUTPATIENT
Start: 2020-05-26 | End: 2020-05-26

## 2020-06-20 ENCOUNTER — APPOINTMENT (OUTPATIENT)
Dept: LAB | Facility: HOSPITAL | Age: 70
End: 2020-06-20
Attending: INTERNAL MEDICINE
Payer: MEDICARE

## 2020-06-20 DIAGNOSIS — E04.9 GOITER: ICD-10-CM

## 2020-06-20 DIAGNOSIS — E55.9 VITAMIN D DEFICIENCY: ICD-10-CM

## 2020-06-20 DIAGNOSIS — E78.2 MIXED HYPERLIPIDEMIA: ICD-10-CM

## 2020-06-20 PROCEDURE — 36415 COLL VENOUS BLD VENIPUNCTURE: CPT

## 2020-06-20 PROCEDURE — 80061 LIPID PANEL: CPT

## 2020-06-20 PROCEDURE — 82607 VITAMIN B-12: CPT

## 2020-06-20 PROCEDURE — 80053 COMPREHEN METABOLIC PANEL: CPT

## 2020-06-20 PROCEDURE — 82306 VITAMIN D 25 HYDROXY: CPT

## 2020-06-20 PROCEDURE — 84443 ASSAY THYROID STIM HORMONE: CPT

## 2020-06-26 ENCOUNTER — OFFICE VISIT (OUTPATIENT)
Dept: INTERNAL MEDICINE CLINIC | Facility: CLINIC | Age: 70
End: 2020-06-26
Payer: MEDICARE

## 2020-06-26 VITALS
BODY MASS INDEX: 26 KG/M2 | HEART RATE: 78 BPM | DIASTOLIC BLOOD PRESSURE: 78 MMHG | WEIGHT: 160.69 LBS | SYSTOLIC BLOOD PRESSURE: 132 MMHG

## 2020-06-26 DIAGNOSIS — M20.42 HAMMER TOES OF BOTH FEET: ICD-10-CM

## 2020-06-26 DIAGNOSIS — M81.0 AGE-RELATED OSTEOPOROSIS WITHOUT CURRENT PATHOLOGICAL FRACTURE: ICD-10-CM

## 2020-06-26 DIAGNOSIS — E55.9 VITAMIN D DEFICIENCY: Primary | ICD-10-CM

## 2020-06-26 DIAGNOSIS — E04.9 GOITER: ICD-10-CM

## 2020-06-26 DIAGNOSIS — M20.41 HAMMER TOES OF BOTH FEET: ICD-10-CM

## 2020-06-26 DIAGNOSIS — E78.2 MIXED HYPERLIPIDEMIA: ICD-10-CM

## 2020-06-26 PROCEDURE — 99214 OFFICE O/P EST MOD 30 MIN: CPT | Performed by: INTERNAL MEDICINE

## 2020-06-26 PROCEDURE — G0463 HOSPITAL OUTPT CLINIC VISIT: HCPCS | Performed by: INTERNAL MEDICINE

## 2020-06-26 NOTE — PROGRESS NOTES
HPI:    Patient ID: Charan Narvaez is a 72year old female.    w    Hyperlipidemia   This is a chronic problem. The current episode started more than 1 year ago. The problem is controlled.  Recent lipid tests were reviewed and are normal. Exacerbating disease Negative. Negative for shortness of breath. Cardiovascular: Negative. Gastrointestinal: Negative. Negative for abdominal pain, dysphagia, heartburn and nausea. Endocrine: Negative. Genitourinary: Negative. Musculoskeletal: Negative.   Tj Reyes Soft. Bowel sounds are normal. She exhibits no distension and no mass. There is no tenderness. There is no rebound. Musculoskeletal: Normal range of motion. General: No tenderness or edema.       Comments: Hammertoes bilateral feet, worse on the l advised to restart on vitamin D at 50,000 units once a week for the next 6 months. She has been having some aches and pains as well as muscle cramps which most likely is related to the vitamin D deficiency.   We will follow-up in the next few weeks if symp

## 2020-06-26 NOTE — PATIENT INSTRUCTIONS
Problem List Items Addressed This Visit        Unprioritized    Goiter     Ultrasound of the thyroid completed in December 2019 looked stable. Multiple small nodules present. Repeat next year. Thyroid function tests have been stable.          Relevant Or

## 2020-06-26 NOTE — ASSESSMENT & PLAN NOTE
Bilateral toe deformities, worse on the left. Thickening and deformity of the right second toe. Referral to podiatry provided.
DEXA scan last done in December 2019 showed improvement in the T-scores from -3.1 to -2. 6. Next DEXA scan is due in 2021. She was started on Prolia after her DEXA scan in 2017. She is due for her Prolia shot today but has not had this authorized as yet.
Lipid panel and liver function tests completed recently looked great on simvastatin at 10 mg daily. Continue on the same dose of medication. Follow-up labs in November as discussed.
Ultrasound of the thyroid completed in December 2019 looked stable. Multiple small nodules present. Repeat next year. Thyroid function tests have been stable.
Vitamin D deficiency quite significant. Patient is advised to restart on vitamin D at 50,000 units once a week for the next 6 months.   She has been having some aches and pains as well as muscle cramps which most likely is related to the vitamin D deficien
No pertinent past medical history

## 2020-07-10 ENCOUNTER — TELEPHONE (OUTPATIENT)
Dept: INTERNAL MEDICINE CLINIC | Facility: CLINIC | Age: 70
End: 2020-07-10

## 2020-07-10 NOTE — TELEPHONE ENCOUNTER
CSS, please assist patient with scheduling a nurse visit for Prolia injection. Last injection administered 12/27/2019, due on or after 06/27/2020. Summary of benefits received from [facility]"[employer] [place]; Prior authorization is not needed.      Primary insurance bene

## 2020-07-15 ENCOUNTER — OFFICE VISIT (OUTPATIENT)
Dept: PODIATRY CLINIC | Facility: CLINIC | Age: 70
End: 2020-07-15
Payer: MEDICARE

## 2020-07-15 DIAGNOSIS — L60.3 ONYCHODYSTROPHY: ICD-10-CM

## 2020-07-15 DIAGNOSIS — M79.672 FOOT PAIN, BILATERAL: Primary | ICD-10-CM

## 2020-07-15 DIAGNOSIS — M20.41 HAMMERTOES OF BOTH FEET: ICD-10-CM

## 2020-07-15 DIAGNOSIS — Z87.39 HISTORY OF OSTEOPOROSIS: ICD-10-CM

## 2020-07-15 DIAGNOSIS — M20.11 VALGUS DEFORMITY OF BOTH GREAT TOES: ICD-10-CM

## 2020-07-15 DIAGNOSIS — L60.0 ONYCHOCRYPTOSIS: ICD-10-CM

## 2020-07-15 DIAGNOSIS — M20.12 VALGUS DEFORMITY OF BOTH GREAT TOES: ICD-10-CM

## 2020-07-15 DIAGNOSIS — B35.1 ONYCHOMYCOSIS: ICD-10-CM

## 2020-07-15 DIAGNOSIS — M79.671 FOOT PAIN, BILATERAL: Primary | ICD-10-CM

## 2020-07-15 DIAGNOSIS — L84 PRE-ULCERATIVE CORN OR CALLOUS: ICD-10-CM

## 2020-07-15 DIAGNOSIS — M20.42 HAMMERTOES OF BOTH FEET: ICD-10-CM

## 2020-07-15 PROCEDURE — 99203 OFFICE O/P NEW LOW 30 MIN: CPT | Performed by: PODIATRIST

## 2020-07-15 NOTE — PROGRESS NOTES
Jerman Rodriguez is a 72year old female. Patient presents with:  New Patient: 2nd toe right foot - thick and long nail - pain scale  with close toe shoes 6/10.     HPI:     This 71-year-old female patient presents to clinic today for evaluation treatment of he status:        Spouse name: Not on file      Number of children: Not on file      Years of education: Not on file      Highest education level: Not on file    Tobacco Use      Smoking status: Never Smoker      Smokeless tobacco: Never Used    Substan Increased first IM angle is noted with increased tibial sesamoid position with dorsal medial prominence and lateral deviation of the hallux. Decreased and asymmetrical joint spacing is noted of her first MPJ with subchondral sclerosis.   Dorsal contracture (CPT=73620)    Hammertoes of both feet    Pre-ulcerative corn or callous    Onychodystrophy    Onychocryptosis    Onychomycosis  -     SURGICAL PATHOLOGY TISSUE    Valgus deformity of both great toes    History of osteoporosis        Plan: Discussed the cl bilateral.  She will monitor the affected areas and will inform the office if signs/symptoms worsen and/or if she experiences any new acute issues. The patient indicates understanding of these issues and agrees to the plan.     Return in about 1 month (a

## 2020-07-17 ENCOUNTER — NURSE ONLY (OUTPATIENT)
Dept: INTERNAL MEDICINE CLINIC | Facility: CLINIC | Age: 70
End: 2020-07-17
Payer: MEDICARE

## 2020-07-17 DIAGNOSIS — M81.0 AGE-RELATED OSTEOPOROSIS WITHOUT CURRENT PATHOLOGICAL FRACTURE: Primary | ICD-10-CM

## 2020-07-17 PROCEDURE — 96372 THER/PROPH/DIAG INJ SC/IM: CPT | Performed by: INTERNAL MEDICINE

## 2020-07-17 NOTE — PROGRESS NOTES
Patient was in today for a scheduled nurse visit. Patient name and  verified. Patient received authorization for injection on 07/10,patient received injection subcutaneous on left upper arm. Patient tolerated injection well with no reaction noted.  Jeanie

## 2020-07-22 ENCOUNTER — TELEPHONE (OUTPATIENT)
Dept: PODIATRY CLINIC | Facility: CLINIC | Age: 70
End: 2020-07-22

## 2020-07-22 NOTE — TELEPHONE ENCOUNTER
Called patient informed of the results of her fungal nail studies which are positive for onychomycosis. Reviewed the treatment options with the patient.   She has a follow-up scheduled for 8/12/2020 at which time we will go over the results in more detail

## 2020-08-12 ENCOUNTER — OFFICE VISIT (OUTPATIENT)
Dept: PODIATRY CLINIC | Facility: CLINIC | Age: 70
End: 2020-08-12
Payer: MEDICARE

## 2020-08-12 VITALS — HEIGHT: 66 IN | BODY MASS INDEX: 25.71 KG/M2 | WEIGHT: 160 LBS

## 2020-08-12 DIAGNOSIS — M20.11 VALGUS DEFORMITY OF BOTH GREAT TOES: ICD-10-CM

## 2020-08-12 DIAGNOSIS — Z87.39 HISTORY OF OSTEOPOROSIS: ICD-10-CM

## 2020-08-12 DIAGNOSIS — M20.42 HAMMERTOES OF BOTH FEET: Primary | ICD-10-CM

## 2020-08-12 DIAGNOSIS — M20.41 HAMMERTOES OF BOTH FEET: Primary | ICD-10-CM

## 2020-08-12 DIAGNOSIS — M20.12 VALGUS DEFORMITY OF BOTH GREAT TOES: ICD-10-CM

## 2020-08-12 DIAGNOSIS — M79.676 PAIN OF TOE, UNSPECIFIED LATERALITY: ICD-10-CM

## 2020-08-12 PROCEDURE — 99213 OFFICE O/P EST LOW 20 MIN: CPT | Performed by: PODIATRIST

## 2020-08-12 NOTE — PROGRESS NOTES
Mikala Maldonado is a 72year old female. Patient presents with: Follow - Up: toe pain    HPI:     This 71-year-old female patient presents to clinic today in follow-up. She returns this date stating she no longer has any pain in her right 2nd digit.   PMH, coffee, occas.           REVIEW OF SYSTEMS:   Review of Systems  Today reviewed systens as documented below  GENERAL HEALTH: feels well otherwise  SKIN: denies any unusual skin lesions or rashes  RESPIRATORY: denies shortness of breath with exertion  CARDIO Apply nightly to affected toenail. Remove once weekly with alcohol swab. Plan: Discussed the clinical findings with the patient along with the treatment options.   Reviewed the results of the fungal nail cultures as listed above along with the treat understanding of these issues and agrees to the plan. Return in about 2 months (around 10/12/2020). We will call her with the results of the nail cultures. Letty Jones will call the office and return sooner if required.     Yao Melton DPM    8/1/2020

## 2020-08-25 DIAGNOSIS — K21.00 GASTROESOPHAGEAL REFLUX DISEASE WITH ESOPHAGITIS: ICD-10-CM

## 2020-08-25 DIAGNOSIS — E78.2 MIXED HYPERLIPIDEMIA: ICD-10-CM

## 2020-08-26 RX ORDER — SIMVASTATIN 10 MG
TABLET ORAL
Qty: 90 TABLET | Refills: 1 | Status: SHIPPED | OUTPATIENT
Start: 2020-08-26 | End: 2020-10-27

## 2020-08-26 RX ORDER — OMEPRAZOLE 40 MG/1
CAPSULE, DELAYED RELEASE ORAL
Qty: 90 CAPSULE | Refills: 1 | Status: SHIPPED | OUTPATIENT
Start: 2020-08-26 | End: 2020-10-27

## 2020-10-14 ENCOUNTER — OFFICE VISIT (OUTPATIENT)
Dept: PODIATRY CLINIC | Facility: CLINIC | Age: 70
End: 2020-10-14
Payer: MEDICARE

## 2020-10-14 VITALS — BODY MASS INDEX: 26.46 KG/M2 | HEIGHT: 64 IN | WEIGHT: 155 LBS

## 2020-10-14 DIAGNOSIS — M20.41 HAMMERTOES OF BOTH FEET: ICD-10-CM

## 2020-10-14 DIAGNOSIS — M20.42 HAMMERTOES OF BOTH FEET: ICD-10-CM

## 2020-10-14 DIAGNOSIS — L60.0 ONYCHOCRYPTOSIS: ICD-10-CM

## 2020-10-14 DIAGNOSIS — L60.3 ONYCHODYSTROPHY: Primary | ICD-10-CM

## 2020-10-14 DIAGNOSIS — Z87.39 HISTORY OF OSTEOPOROSIS: ICD-10-CM

## 2020-10-14 DIAGNOSIS — B35.1 ONYCHOMYCOSIS: ICD-10-CM

## 2020-10-14 PROCEDURE — 99213 OFFICE O/P EST LOW 20 MIN: CPT | Performed by: PODIATRIST

## 2020-10-14 NOTE — PROGRESS NOTES
Liv Mock is a 72year old female. Patient presents with: Follow - Up: toe pain    HPI:     This 79-year-old female patient presents to clinic today in follow-up. Right second toe nail is improved with antifungal treatment with Penlac.   In addition sh No      Drug use: No    Other Topics      Concerns:        Caffeine Concern: Yes          coffee, occas.           REVIEW OF SYSTEMS:   Review of Systems  Today reviewed systens as documented below  GENERAL HEALTH: feels well otherwise  SKIN: denies any unu visit:    Onychodystrophy    Onychomycosis    Onychocryptosis    Hammertoes of both feet    History of osteoporosis        Plan: Discussed the clinical findings with the patient along with the treatment options.   Debrided her affected toenails bilateral marah

## 2020-10-22 ENCOUNTER — LAB ENCOUNTER (OUTPATIENT)
Dept: LAB | Facility: HOSPITAL | Age: 70
End: 2020-10-22
Attending: INTERNAL MEDICINE
Payer: MEDICARE

## 2020-10-22 DIAGNOSIS — E04.9 GOITER: ICD-10-CM

## 2020-10-22 DIAGNOSIS — E78.2 MIXED HYPERLIPIDEMIA: ICD-10-CM

## 2020-10-22 DIAGNOSIS — E55.9 VITAMIN D DEFICIENCY: ICD-10-CM

## 2020-10-22 PROCEDURE — 82306 VITAMIN D 25 HYDROXY: CPT

## 2020-10-22 PROCEDURE — 84439 ASSAY OF FREE THYROXINE: CPT

## 2020-10-22 PROCEDURE — 84443 ASSAY THYROID STIM HORMONE: CPT

## 2020-10-22 PROCEDURE — 80053 COMPREHEN METABOLIC PANEL: CPT

## 2020-10-22 PROCEDURE — 82607 VITAMIN B-12: CPT

## 2020-10-22 PROCEDURE — 36415 COLL VENOUS BLD VENIPUNCTURE: CPT

## 2020-10-22 PROCEDURE — 85025 COMPLETE CBC W/AUTO DIFF WBC: CPT

## 2020-10-22 PROCEDURE — 80061 LIPID PANEL: CPT

## 2020-10-27 ENCOUNTER — TELEPHONE (OUTPATIENT)
Dept: INTERNAL MEDICINE CLINIC | Facility: CLINIC | Age: 70
End: 2020-10-27

## 2020-10-27 ENCOUNTER — OFFICE VISIT (OUTPATIENT)
Dept: INTERNAL MEDICINE CLINIC | Facility: CLINIC | Age: 70
End: 2020-10-27
Payer: MEDICARE

## 2020-10-27 VITALS
BODY MASS INDEX: 28.66 KG/M2 | HEART RATE: 69 BPM | SYSTOLIC BLOOD PRESSURE: 131 MMHG | DIASTOLIC BLOOD PRESSURE: 77 MMHG | WEIGHT: 167.88 LBS | RESPIRATION RATE: 18 BRPM | HEIGHT: 64 IN

## 2020-10-27 DIAGNOSIS — M81.0 AGE-RELATED OSTEOPOROSIS WITHOUT CURRENT PATHOLOGICAL FRACTURE: ICD-10-CM

## 2020-10-27 DIAGNOSIS — Z23 FLU VACCINE NEED: ICD-10-CM

## 2020-10-27 DIAGNOSIS — Z12.31 VISIT FOR SCREENING MAMMOGRAM: ICD-10-CM

## 2020-10-27 DIAGNOSIS — E04.9 GOITER: ICD-10-CM

## 2020-10-27 DIAGNOSIS — Z00.00 ENCOUNTER FOR ANNUAL HEALTH EXAMINATION: ICD-10-CM

## 2020-10-27 DIAGNOSIS — E55.9 VITAMIN D DEFICIENCY: Primary | ICD-10-CM

## 2020-10-27 DIAGNOSIS — Z00.00 ENCOUNTER FOR INITIAL PREVENTIVE PHYSICAL EXAMINATION COVERED BY MEDICARE: ICD-10-CM

## 2020-10-27 DIAGNOSIS — K21.00 GASTROESOPHAGEAL REFLUX DISEASE WITH ESOPHAGITIS WITHOUT HEMORRHAGE: ICD-10-CM

## 2020-10-27 DIAGNOSIS — Z23 NEED FOR VACCINATION: ICD-10-CM

## 2020-10-27 DIAGNOSIS — Z11.59 NEED FOR HEPATITIS C SCREENING TEST: ICD-10-CM

## 2020-10-27 DIAGNOSIS — E78.2 MIXED HYPERLIPIDEMIA: ICD-10-CM

## 2020-10-27 DIAGNOSIS — Z12.11 COLON CANCER SCREENING: ICD-10-CM

## 2020-10-27 DIAGNOSIS — K21.00 GASTROESOPHAGEAL REFLUX DISEASE WITH ESOPHAGITIS: ICD-10-CM

## 2020-10-27 PROCEDURE — G0439 PPPS, SUBSEQ VISIT: HCPCS | Performed by: INTERNAL MEDICINE

## 2020-10-27 PROCEDURE — 90662 IIV NO PRSV INCREASED AG IM: CPT | Performed by: INTERNAL MEDICINE

## 2020-10-27 PROCEDURE — G0008 ADMIN INFLUENZA VIRUS VAC: HCPCS | Performed by: INTERNAL MEDICINE

## 2020-10-27 PROCEDURE — G0009 ADMIN PNEUMOCOCCAL VACCINE: HCPCS | Performed by: INTERNAL MEDICINE

## 2020-10-27 PROCEDURE — 90732 PPSV23 VACC 2 YRS+ SUBQ/IM: CPT | Performed by: INTERNAL MEDICINE

## 2020-10-27 RX ORDER — OMEPRAZOLE 40 MG/1
40 CAPSULE, DELAYED RELEASE ORAL
Qty: 90 CAPSULE | Refills: 2 | Status: SHIPPED | OUTPATIENT
Start: 2020-10-27 | End: 2021-08-16

## 2020-10-27 RX ORDER — SIMVASTATIN 10 MG
10 TABLET ORAL NIGHTLY
Qty: 90 TABLET | Refills: 2 | Status: SHIPPED | OUTPATIENT
Start: 2020-10-27 | End: 2021-08-16

## 2020-10-27 NOTE — PATIENT INSTRUCTIONS
Problem List Items Addressed This Visit        Unprioritized    Encounter for initial preventive physical examination covered by Medicare     Normal exam.  Labs as ordered. Skin check normal.  No significant abnormal nevi.   Breast exam completed–no palpab office visit in 6 months. Relevant Orders    TSH W REFLEX TO FREE T4    Hyperlipidemia     Lipid panel and liver function tests look stable on atorvastatin at 10 mg daily. She has tolerated her medications well.   Recheck labs prior to the next vis [facility]  Annually for Diabetics No results found for: A1C No flowsheet data found.     Fasting Blood Sugar (FSB)   Patient must be diagnosed with one of the following:   • Hypertension   • Dyslipidemia   • Obesity (BMI ³30 kg/m2)   • Previous elevated im Glaucoma Screening      Ophthalmology Visit   Covered annually for Diabetics, people with Glaucoma family history,   Americans over age 48   Americans over age 72 No flowsheet data found.  OK to schedule if you are in this risk group, make Hemophiliacs who received Factor VIII or IX concentrates   Clients of institutions for the mentally retarded   Persons who live in the same house as a HepB virus carrier   Homosexual men   Illicit injectable drug abusers     Tetanus Toxoid- Only covered

## 2020-10-27 NOTE — ASSESSMENT & PLAN NOTE
DEXA scan completed in December 2019 shows osteoporosis but improvement in T-scores from -3.1 to -2.6. She has been on Prolia–advised to take it every 6 months. She was started on Prolia in 2017 and expect to stay on Prolia till 2022.

## 2020-10-27 NOTE — ASSESSMENT & PLAN NOTE
Stable appearing ultrasound of the thyroid with multiple nodules. Thyroid function tests do look stable. Recheck ultrasound of the thyroid prior to the next office visit in 6 months.

## 2020-10-27 NOTE — ASSESSMENT & PLAN NOTE
Lipid panel and liver function tests look stable on atorvastatin at 10 mg daily. She has tolerated her medications well. Recheck labs prior to the next visit.

## 2020-10-27 NOTE — PROGRESS NOTES
HPI:   Андрей Tristan is a 72year old female who presents for a Medicare Initial Preventative Physical Exam (Welcome to Medicare- < 12 months on Medicare).       Annual Physical due on 10/27/2021        Fall/Risk Assessment   She has been screened for Fall Goiter     Dermatitis     Osteoporosis     Vitamin D deficiency     Sciatic pain, right     Hammer toes of both feet     Encounter for initial preventive physical examination covered by Medicare    Wt Readings from Last 3 Encounters:  10/27/20 : 167 lb 1 She  reports that she has never smoked. She has never used smokeless tobacco. She reports that she does not drink alcohol or use drugs.      REVIEW OF SYSTEMS:   Review of Systems   GENERAL: feels well otherwise  SKIN: denies any unusual skin lesions  EYE Many people I talk to seem to mumble (or don't speak clearly): No People get annoyed because I misunderstand what they say: No   I misunderstand what others are saying and make inappropriate responses: No I avoid social activities because I cannot hear w left inguinal area. Genitourinary:    Uterus and rectum normal.   Rectum:      Guaiac result negative. No breast tenderness. There is no rash on the right labia. There is no rash on the left labia. No vaginal discharge.       Genitourinary Comments: significant abnormal nevi. Breast exam completed–no palpable abnormalities, discharge from the nipples or axillary adenopathy. Mammogram due on 02/02/2010  Orders provided today.   Patient however declines the mammogram.  DEXA scan completed in 2019, due tolerated her medications well. Recheck labs prior to the next visit.          Relevant Medications    simvastatin 10 MG Oral Tab    Other Relevant Orders    CBC WITH DIFFERENTIAL WITH PLATELET    COMP METABOLIC PANEL (14)    LIPID PANEL    Osteoporosis PLANNING FIRST 30 MINS    Flu vaccine need  -     FLU VACC HIGH DOSE PRSV FREE    Need for vaccination  -     PNEUMOCOCCAL IMM (PNEUMOVAX)    Need for hepatitis C screening test  -     HCV ANTIBODY;  Future    Goiter  -     TSH W REFLEX TO FREE T4; Future Electrocardiogram date     Colorectal Cancer Screening      Colonoscopy Screen every 10 years Colonoscopy due on 05/18/2001 Update [facility] if applicable    Flex Sigmoidoscopy Screen every 10 years No results found for this or any previous visit. This may be covered with your prescription benefits, but Medicare does not cover unless Medically needed    Zoster   Not covered by Medicare Part B No vaccine history found This may be covered with your pharmacy  prescription benefits

## 2020-10-27 NOTE — ASSESSMENT & PLAN NOTE
Chronic gastroesophageal reflux disease with a history of dysphagia and a Schatzki's ring which was dilated. She has been asymptomatic since then. Continue to monitor at this time. Call for any other concerns.   She continues on omeprazole at this time w

## 2020-10-27 NOTE — ASSESSMENT & PLAN NOTE
Normal exam.  Labs as ordered. Skin check normal.  No significant abnormal nevi. Breast exam completed–no palpable abnormalities, discharge from the nipples or axillary adenopathy. Mammogram due on 02/02/2010  Orders provided today.   Patient however dec

## 2020-10-27 NOTE — ASSESSMENT & PLAN NOTE
Vitamin D levels currently well supplemented. She has completed 50,000 units once a week for about 6 months now. She is advised to transition to vitamin D at 2000 units daily.

## 2020-10-28 NOTE — TELEPHONE ENCOUNTER
CSS, please reschedule Prolia injection. Patient received Prolia on 2020, is due on or after 2021. Prolia is administered every 6 months. Progress Notes  Peter Chaudhary (Certified Medical Assistant)     Patient was in today for a scheduled nurse visit. Patient name and  verified. Patient received authorization for injection on 07/10,patient received injection subcutaneous on left upper arm. Patient tolerated injection well with no reaction noted. Patient advised to make a nurse visit for the next injection after 2021.  Patient verbalized understanding

## 2020-10-30 ENCOUNTER — LAB ENCOUNTER (OUTPATIENT)
Dept: LAB | Facility: HOSPITAL | Age: 70
End: 2020-10-30
Attending: INTERNAL MEDICINE
Payer: MEDICARE

## 2020-10-30 DIAGNOSIS — Z12.11 COLON CANCER SCREENING: ICD-10-CM

## 2020-10-30 PROCEDURE — 82274 ASSAY TEST FOR BLOOD FECAL: CPT

## 2020-11-20 NOTE — TELEPHONE ENCOUNTER
Nabila=please check the PA approval for the Prolia injection for the month of November. ,( last PA note was on 5/10/18 and was approved,) not sure if we need to call and get approval each time patient will get the shot. Please Approve or Refuse.   Send to Pharmacy per Pt's Request:      Next Visit Date:  Visit date not found   Last Visit Date: 4/16/2020    Hemoglobin A1C (%)   Date Value   12/23/2019 6.0   06/12/2019 5.7   11/12/2018 5.9             ( goal A1C is < 7)   BP Readings from Last 3 Encounters:   04/16/20 119/65   04/14/20 119/65   02/12/20 136/71          (goal 120/80)  BUN   Date Value Ref Range Status   04/14/2020 12 8 - 23 mg/dL Final     CREATININE   Date Value Ref Range Status   04/14/2020 0.69 0.50 - 0.90 mg/dL Final     Potassium   Date Value Ref Range Status   04/14/2020 3.7 3.7 - 5.3 mmol/L Final

## 2020-12-02 RX ORDER — ERGOCALCIFEROL 1.25 MG/1
50000 CAPSULE ORAL WEEKLY
Qty: 12 CAPSULE | Refills: 0 | Status: SHIPPED | OUTPATIENT
Start: 2020-12-02 | End: 2021-01-01

## 2021-01-13 ENCOUNTER — OFFICE VISIT (OUTPATIENT)
Dept: PODIATRY CLINIC | Facility: CLINIC | Age: 71
End: 2021-01-13
Payer: MEDICARE

## 2021-01-13 DIAGNOSIS — L60.3 ONYCHODYSTROPHY: ICD-10-CM

## 2021-01-13 DIAGNOSIS — M20.42 HAMMERTOES OF BOTH FEET: Primary | ICD-10-CM

## 2021-01-13 DIAGNOSIS — B35.1 ONYCHOMYCOSIS: ICD-10-CM

## 2021-01-13 DIAGNOSIS — M79.674 PAIN OF TOE OF RIGHT FOOT: ICD-10-CM

## 2021-01-13 DIAGNOSIS — M20.41 HAMMERTOES OF BOTH FEET: Primary | ICD-10-CM

## 2021-01-13 PROCEDURE — 99213 OFFICE O/P EST LOW 20 MIN: CPT | Performed by: PODIATRIST

## 2021-01-13 NOTE — PROGRESS NOTES
Merritt Saeed is a 72year old female. Patient presents with: Follow - Up: Fungal nail right second toe  HPI:     This 76-year-old female patient presents to clinic today in follow-up.   She returns today with significant improvement of her fungal nail on h Never Used    Substance and Sexual Activity      Alcohol use: No      Drug use: No    Other Topics      Concerns:        Caffeine Concern: Yes          coffee, occas.           REVIEW OF SYSTEMS:   Review of Systems  Today reviewed systens as documented bel and hammertoes bilateral with her second digits long and Olson-like with prominence of the PIPJ secondary to their contracture, on the left more than the right, with slight irritation to the PIPJ of her left second digit secondary to irritation from the s about 3 months (around 4/13/2021). Erik Garcia will call the office and return sooner if required.     Alicia Lafleur DPM    1/13/2021

## 2021-01-18 ENCOUNTER — NURSE ONLY (OUTPATIENT)
Dept: INTERNAL MEDICINE CLINIC | Facility: CLINIC | Age: 71
End: 2021-01-18
Payer: MEDICARE

## 2021-01-18 ENCOUNTER — TELEPHONE (OUTPATIENT)
Dept: INTERNAL MEDICINE CLINIC | Facility: CLINIC | Age: 71
End: 2021-01-18

## 2021-01-18 DIAGNOSIS — M81.0 AGE-RELATED OSTEOPOROSIS WITHOUT CURRENT PATHOLOGICAL FRACTURE: Primary | ICD-10-CM

## 2021-01-18 PROCEDURE — 96372 THER/PROPH/DIAG INJ SC/IM: CPT | Performed by: INTERNAL MEDICINE

## 2021-01-18 NOTE — PROGRESS NOTES
Patient was in today for a scheduled nurse visit. Patient name and  verified. Patient received last injection on 2020,patient received injection subcutaneous on left upper arm. Patient tolerated injection well with no reaction noted.  Patient advis

## 2021-01-18 NOTE — TELEPHONE ENCOUNTER
Patient was scheduled today to receive prolia injection. Injection was given without proper authorization.   Encounter labeled PA on 10/27 was not authorization for prolia given today  Please obtain authorization for vaccine given today if possible

## 2021-01-18 NOTE — TELEPHONE ENCOUNTER
Summary of benefits received from [address] [place] [address]; Prior authorization is not needed. Primary insurance benefits are subject to a $203 deductible ($0 met) and a 20% co-insurance for the administration and cost of Prolia.     Secondary insurance benefits is a Medi

## 2021-04-28 ENCOUNTER — OFFICE VISIT (OUTPATIENT)
Dept: PODIATRY CLINIC | Facility: CLINIC | Age: 71
End: 2021-04-28
Payer: MEDICARE

## 2021-04-28 DIAGNOSIS — B35.1 ONYCHOMYCOSIS: ICD-10-CM

## 2021-04-28 DIAGNOSIS — M79.674 PAIN OF TOE OF RIGHT FOOT: ICD-10-CM

## 2021-04-28 DIAGNOSIS — M20.41 HAMMERTOES OF BOTH FEET: Primary | ICD-10-CM

## 2021-04-28 DIAGNOSIS — L60.3 ONYCHODYSTROPHY: ICD-10-CM

## 2021-04-28 DIAGNOSIS — M20.42 HAMMERTOES OF BOTH FEET: Primary | ICD-10-CM

## 2021-04-28 PROCEDURE — 99213 OFFICE O/P EST LOW 20 MIN: CPT | Performed by: PODIATRIST

## 2021-04-28 NOTE — PROGRESS NOTES
Charan Narvaez is a 72year old female. Patient presents with: Follow - Up: Fungal nail right second toe - Long nails  HPI:     This 73-year-old female patient presents to clinic today for follow-up and toenail care.   She returns today with continued improv file      Years of education: Not on file      Highest education level: Not on file    Tobacco Use      Smoking status: Never Smoker      Smokeless tobacco: Never Used    Vaping Use      Vaping Use: Never used    Substance and Sexual Activity      Alcohol normal.  4. Musculoskeletal: All muscle groups are graded 5 out of 5 in the foot and ankle.   She has HAV deformities and hammertoes bilateral with her second digits long and Olson-like with prominence of the PIPJ secondary to their contracture, on the lef inform the office with any acute issues. The patient indicates understanding of these issues and agrees to the plan. Return in about 3 months (around 7/28/2021). Gopi Flores will call the office and return sooner if required.     Bill Araya DPM    4/28

## 2021-05-22 ENCOUNTER — LAB ENCOUNTER (OUTPATIENT)
Dept: LAB | Facility: HOSPITAL | Age: 71
End: 2021-05-22
Attending: INTERNAL MEDICINE
Payer: MEDICARE

## 2021-05-22 DIAGNOSIS — Z11.59 NEED FOR HEPATITIS C SCREENING TEST: ICD-10-CM

## 2021-05-22 DIAGNOSIS — E78.2 MIXED HYPERLIPIDEMIA: ICD-10-CM

## 2021-05-22 DIAGNOSIS — E04.9 GOITER: ICD-10-CM

## 2021-05-22 PROCEDURE — 85025 COMPLETE CBC W/AUTO DIFF WBC: CPT

## 2021-05-22 PROCEDURE — 80061 LIPID PANEL: CPT

## 2021-05-22 PROCEDURE — 80053 COMPREHEN METABOLIC PANEL: CPT

## 2021-05-22 PROCEDURE — 86803 HEPATITIS C AB TEST: CPT

## 2021-05-22 PROCEDURE — 36415 COLL VENOUS BLD VENIPUNCTURE: CPT

## 2021-05-22 PROCEDURE — 84443 ASSAY THYROID STIM HORMONE: CPT

## 2021-05-27 ENCOUNTER — OFFICE VISIT (OUTPATIENT)
Dept: INTERNAL MEDICINE CLINIC | Facility: CLINIC | Age: 71
End: 2021-05-27
Payer: MEDICARE

## 2021-05-27 ENCOUNTER — TELEPHONE (OUTPATIENT)
Dept: INTERNAL MEDICINE CLINIC | Facility: CLINIC | Age: 71
End: 2021-05-27

## 2021-05-27 VITALS
HEIGHT: 64 IN | HEART RATE: 78 BPM | SYSTOLIC BLOOD PRESSURE: 128 MMHG | BODY MASS INDEX: 30.22 KG/M2 | WEIGHT: 177 LBS | DIASTOLIC BLOOD PRESSURE: 80 MMHG | RESPIRATION RATE: 16 BRPM | TEMPERATURE: 98 F

## 2021-05-27 DIAGNOSIS — Z12.11 COLON CANCER SCREENING: ICD-10-CM

## 2021-05-27 DIAGNOSIS — E78.2 MIXED HYPERLIPIDEMIA: Primary | ICD-10-CM

## 2021-05-27 DIAGNOSIS — M81.0 AGE-RELATED OSTEOPOROSIS WITHOUT CURRENT PATHOLOGICAL FRACTURE: ICD-10-CM

## 2021-05-27 DIAGNOSIS — E55.9 VITAMIN D DEFICIENCY: ICD-10-CM

## 2021-05-27 PROCEDURE — 99214 OFFICE O/P EST MOD 30 MIN: CPT | Performed by: INTERNAL MEDICINE

## 2021-05-27 NOTE — TELEPHONE ENCOUNTER
This patient is due for her next Prolia shot in July 2021. Orders have been placed.   Please prior authorize

## 2021-05-27 NOTE — ASSESSMENT & PLAN NOTE
Last DEXA scan completed in December 2019. Due for a follow-up in December 2021. T-scores at that time had improved from -3.1 to -2.6. She has been on Prolia which she has been taking diligently every 6 months.   She started on Prolia in 2017 and is expe

## 2021-05-27 NOTE — PATIENT INSTRUCTIONS
Problem List Items Addressed This Visit        Unprioritized    Hyperlipidemia - Primary     Lipid panel liver function test have been stable on simvastatin at 10 mg daily. She has tolerated medications well.   Continue on the same dose of medication and f

## 2021-05-27 NOTE — TELEPHONE ENCOUNTER
Patient is due on 07/18/2021, summary of benefits have to be completed within 30 days of next dose. Summary of benefits will need to be initiated after 06/18/2021.  Message will be postponed until that date to initiate request.

## 2021-05-27 NOTE — ASSESSMENT & PLAN NOTE
Lipid panel liver function test have been stable on simvastatin at 10 mg daily. She has tolerated medications well. Continue on the same dose of medication and follow-up with recheck labs.

## 2021-05-27 NOTE — PROGRESS NOTES
HPI:    Patient ID: Ilene Maria is a 80year old female.       Immunization History  Administered            Date(s) Administered    FLU VAC High Dose 72 YRS & Older PRSV Free (32550)                          12/27/2019  10/27/2020      Influenza nightly. 90 tablet 2   • Ciclopirox 8 % External Solution Apply nightly to affected toenail. Remove once weekly with alcohol swab. 6 mL 0   • aspirin 81 MG Oral Chew Tab Chew  by mouth.        Allergies:No Known Allergies      05/27/21  1332   BP: 128/80 normal.      Gait: Gait normal.      Deep Tendon Reflexes: Reflexes normal.   Psychiatric:         Mood and Affect: Mood normal.         Behavior: Behavior normal.         Thought Content:  Thought content normal.                ASSESSMENT/PLAN:     Problem Prescriptions Disp Refills   • Denosumab 60 MG/ML Subcutaneous Solution Prefilled Syringe 1 mL 5     Sig: Inject 1 mL (60 mg total) into the skin every 6 (six) months.        Imaging & Referrals:  None       KF#[id number]

## 2021-05-27 NOTE — ASSESSMENT & PLAN NOTE
This has been well supplemented, advised to take an over-the-counter vitamin D at 2000 units daily. Follow-up labs as ordered.

## 2021-06-10 ENCOUNTER — OFFICE VISIT (OUTPATIENT)
Dept: SURGERY | Facility: CLINIC | Age: 71
End: 2021-06-10
Payer: MEDICARE

## 2021-06-10 VITALS — BODY MASS INDEX: 30.22 KG/M2 | HEIGHT: 64 IN | WEIGHT: 177 LBS

## 2021-06-10 DIAGNOSIS — L72.3 INFLAMED SEBACEOUS CYST: Primary | ICD-10-CM

## 2021-06-10 PROCEDURE — 99203 OFFICE O/P NEW LOW 30 MIN: CPT | Performed by: SURGERY

## 2021-06-10 NOTE — H&P
Chief complaint: Patient presents with:  Lesion: Referred by Dr. Mallory Ramos for lesion on front of neck which has been present for several years. HPI: [facility] is referred for an enlarging irritated and open cyst of the anterior neck region.   She has n esophageal   • Skin cancer Maternal Grandfather    • Stroke Paternal Grandmother         CVA   • Diabetes Paternal Uncle    • Diabetes Paternal Aunt         Review of Systems:   GENERAL: feels generally well  SKIN: no ulcerated or worrisome skin lesions  E scheduled for EUA, anoscopy, possible hemorrhoidectomy, possible banding, possible biopsy, possible sigmoidoscopy. We discussed the DDx, natural history, treatment options, surgical approaches and alternatives.  We also discussed the risks, benefits, an

## 2021-06-18 NOTE — TELEPHONE ENCOUNTER
Prolia insurance verification and prior authorization form has been faxed to [employer] [phone].  It will take 7-10 business days for a response

## 2021-06-24 ENCOUNTER — OFFICE VISIT (OUTPATIENT)
Dept: SURGERY | Facility: CLINIC | Age: 71
End: 2021-06-24
Payer: MEDICARE

## 2021-06-24 VITALS — BODY MASS INDEX: 30.22 KG/M2 | HEIGHT: 64 IN | WEIGHT: 177 LBS

## 2021-06-24 DIAGNOSIS — L72.3 INFLAMED SEBACEOUS CYST: Primary | ICD-10-CM

## 2021-06-24 PROCEDURE — 12041 INTMD RPR N-HF/GENIT 2.5CM/<: CPT | Performed by: SURGERY

## 2021-06-24 PROCEDURE — 11423 EXC H-F-NK-SP B9+MARG 2.1-3: CPT | Performed by: SURGERY

## 2021-06-24 NOTE — PROCEDURES
Procedure Note  The risks, benefits, alternatives and expected recovery were explained. The pt expressed understanding and wished to proceed with the procedure.       Preop:  Sebaceous cyst of anterior neck, 2.1 cm   Postop:  Same  Procedure: Excision of S

## 2021-06-29 ENCOUNTER — TELEPHONE (OUTPATIENT)
Dept: SURGERY | Facility: CLINIC | Age: 71
End: 2021-06-29

## 2021-07-08 ENCOUNTER — VIRTUAL PHONE E/M (OUTPATIENT)
Dept: SURGERY | Facility: CLINIC | Age: 71
End: 2021-07-08
Payer: MEDICARE

## 2021-07-08 DIAGNOSIS — Z98.890 POST-OPERATIVE STATE: Primary | ICD-10-CM

## 2021-07-08 PROCEDURE — 99024 POSTOP FOLLOW-UP VISIT: CPT | Performed by: SURGERY

## 2021-07-08 NOTE — PROGRESS NOTES
Virtual Telephone Check-In    Trang Burk verbally consents to a Virtual/Telephone Check-In visit on 07/08/21. Patient has been referred to the [facility] website at [email or web address] to review the yearly Consent to Treat document.     Patient understand

## 2021-07-19 ENCOUNTER — NURSE ONLY (OUTPATIENT)
Dept: INTERNAL MEDICINE CLINIC | Facility: CLINIC | Age: 71
End: 2021-07-19
Payer: MEDICARE

## 2021-07-19 DIAGNOSIS — M81.0 AGE-RELATED OSTEOPOROSIS WITHOUT CURRENT PATHOLOGICAL FRACTURE: Primary | ICD-10-CM

## 2021-07-19 PROCEDURE — 96372 THER/PROPH/DIAG INJ SC/IM: CPT | Performed by: INTERNAL MEDICINE

## 2021-07-19 NOTE — PROGRESS NOTES
Patient came in for nurse visit, prolia injection. I verified orders and name/. Pt was administered prolia on left upper arm. Subcutaneously. Pt tolerated injection, no reactions noted. Pt was advised next prolia injection in 6mo.  Please call one month

## 2021-08-04 ENCOUNTER — OFFICE VISIT (OUTPATIENT)
Dept: PODIATRY CLINIC | Facility: CLINIC | Age: 71
End: 2021-08-04
Payer: MEDICARE

## 2021-08-04 DIAGNOSIS — L60.3 ONYCHODYSTROPHY: ICD-10-CM

## 2021-08-04 DIAGNOSIS — B35.1 ONYCHOMYCOSIS: Primary | ICD-10-CM

## 2021-08-04 DIAGNOSIS — L60.0 ONYCHOCRYPTOSIS: ICD-10-CM

## 2021-08-04 DIAGNOSIS — M20.42 HAMMERTOES OF BOTH FEET: ICD-10-CM

## 2021-08-04 DIAGNOSIS — M20.41 HAMMERTOES OF BOTH FEET: ICD-10-CM

## 2021-08-04 PROCEDURE — 99213 OFFICE O/P EST LOW 20 MIN: CPT | Performed by: PODIATRIST

## 2021-08-04 NOTE — PROGRESS NOTES
Jc Nichols is a 80year old female. Patient presents with: Follow - Up: Fungal nail right second toe - Long nails - Hammertoes    HPI:     This 71-year-old female patient presents to clinic today for follow-up and toenail care.   She states that she has CVA   • Diabetes Paternal Uncle    • Diabetes Paternal Aunt       Social History    Socioeconomic History      Marital status:        Spouse name: Not on file      Number of children: Not on file      Years of education: Not on file      Highest e deformities and hammertoes bilateral with her second digits long and Olson-like with  prominence of the PIPJ secondary to their contracture, on the left more than the right.     ASSESSMENT AND PLAN:   Diagnoses and all orders for this visit:    Onychomycos

## 2021-08-16 DIAGNOSIS — K21.00 GASTROESOPHAGEAL REFLUX DISEASE WITH ESOPHAGITIS: ICD-10-CM

## 2021-08-16 DIAGNOSIS — E78.2 MIXED HYPERLIPIDEMIA: ICD-10-CM

## 2021-08-16 RX ORDER — SIMVASTATIN 10 MG
10 TABLET ORAL NIGHTLY
Qty: 90 TABLET | Refills: 1 | Status: SHIPPED | OUTPATIENT
Start: 2021-08-16 | End: 2022-01-11

## 2021-08-16 RX ORDER — OMEPRAZOLE 40 MG/1
40 CAPSULE, DELAYED RELEASE ORAL
Qty: 90 CAPSULE | Refills: 1 | Status: SHIPPED | OUTPATIENT
Start: 2021-08-16 | End: 2022-01-11

## 2021-08-16 NOTE — TELEPHONE ENCOUNTER
Refill passed per [address] [place] protocol. Requested Prescriptions   Pending Prescriptions Disp Refills    SIMVASTATIN 10 MG Oral Tab [Pharmacy Med Name: Simvastatin 10 Mg Tab Nort] 90 tablet 0     Sig: Take 1 tablet (10 mg total) by mouth nightly.         Cholesterol Medication Protocol Passed - 8/16/2021  1:32 AM        Passed - ALT in past 12 months        Passed - LDL in past 12 months        Passed - Last ALT < 80       Lab Results   Component Value Date    ALT 40 05/22/2021             Passed - Last LDL < 130     Lab Results   Component Value Date     (H) 05/22/2021             Passed - Appointment in past 12 or next 3 months           OMEPRAZOLE 40 MG Oral Capsule Delayed Release [Pharmacy Med Name: Omeprazole Dr 40 Mg Cap Nort] 90 capsule 0     Sig: Take 1 capsule (40 mg total) by mouth every morning before breakfast.        Gastrointestional Medication Protocol Passed - 8/16/2021  1:32 AM        Passed - Appointment in past 12 or next 3 months               Recent Outpatient Visits              1 week ago     [facility] [place], [place]    Office Visit    4 weeks ago Age-related osteoporosis without current pathological fracture    [facility], [address], [place]    Nurse Only    1 month ago [facility] for [address], [place]    Virtual Phone E/M    1 month ago Inflamed sebaceous cyst    [facility] Suzy Lemon MD    Office Visit    2 months ago Inflamed sebaceous cyst    [facility] Suzy Lemon MD    Office Visit            Future Appointments         Provider Department Appt Notes    [facility] [address] f/u with shoes

## 2021-08-25 ENCOUNTER — OFFICE VISIT (OUTPATIENT)
Dept: INTERNAL MEDICINE CLINIC | Facility: CLINIC | Age: 71
End: 2021-08-25
Payer: MEDICARE

## 2021-08-25 ENCOUNTER — HOSPITAL ENCOUNTER (OUTPATIENT)
Dept: GENERAL RADIOLOGY | Facility: HOSPITAL | Age: 71
Discharge: HOME | End: 2021-08-25
Attending: INTERNAL MEDICINE
Payer: MEDICARE

## 2021-08-25 ENCOUNTER — NURSE TRIAGE (OUTPATIENT)
Dept: INTERNAL MEDICINE CLINIC | Facility: CLINIC | Age: 71
End: 2021-08-25

## 2021-08-25 VITALS
SYSTOLIC BLOOD PRESSURE: 139 MMHG | HEART RATE: 94 BPM | HEIGHT: 64 IN | WEIGHT: 170 LBS | DIASTOLIC BLOOD PRESSURE: 76 MMHG | BODY MASS INDEX: 29.02 KG/M2

## 2021-08-25 DIAGNOSIS — S61.210A LACERATION OF RIGHT INDEX FINGER WITHOUT FOREIGN BODY WITHOUT DAMAGE TO NAIL, INITIAL ENCOUNTER: ICD-10-CM

## 2021-08-25 DIAGNOSIS — R07.81 RIB PAIN ON RIGHT SIDE: ICD-10-CM

## 2021-08-25 DIAGNOSIS — R07.81 RIB PAIN ON RIGHT SIDE: Primary | ICD-10-CM

## 2021-08-25 DIAGNOSIS — Z23 NEED FOR VACCINATION: ICD-10-CM

## 2021-08-25 PROCEDURE — 71101 X-RAY EXAM UNILAT RIBS/CHEST: CPT | Performed by: INTERNAL MEDICINE

## 2021-08-25 PROCEDURE — 90715 TDAP VACCINE 7 YRS/> IM: CPT | Performed by: INTERNAL MEDICINE

## 2021-08-25 PROCEDURE — 90471 IMMUNIZATION ADMIN: CPT | Performed by: INTERNAL MEDICINE

## 2021-08-25 PROCEDURE — 99214 OFFICE O/P EST MOD 30 MIN: CPT | Performed by: INTERNAL MEDICINE

## 2021-08-25 RX ORDER — CEPHALEXIN 500 MG/1
500 CAPSULE ORAL 3 TIMES DAILY
Qty: 21 CAPSULE | Refills: 0 | Status: SHIPPED | OUTPATIENT
Start: 2021-08-25 | End: 2021-09-01

## 2021-08-25 NOTE — PROGRESS NOTES
Chase Hein is a 80year old female.   Patient presents with:  Fall: on Monday      HPI:   Urgent visit   C/c right hand index finger --   C/o finger got pinched when opening a folding chair and it it took a piece of the skin out and wouldn't stop bleeding wheezing  CARDIOVASCULAR: denies chest pain on exertion, palpitations, swelling in feet  GI: denies abdominal pain and denies heartburn, nausea or vomiting  MUS: No back pain, joint pain, ? muscle pain    EXAM:   /76   Pulse 94   Ht 5' 4\" (1.626 m)

## 2021-08-25 NOTE — TELEPHONE ENCOUNTER
Action Requested: Summary for Provider     []  Critical Lab, Recommendations Needed  [] Need Additional Advice  []   FYI    []   Need Orders  [] Need Medications Sent to Pharmacy  []  Other     SUMMARY: appt scheduled today with Dr. Sree Garcia as no appt a

## 2021-10-06 ENCOUNTER — OFFICE VISIT (OUTPATIENT)
Dept: PODIATRY CLINIC | Facility: CLINIC | Age: 71
End: 2021-10-06
Payer: MEDICARE

## 2021-10-06 DIAGNOSIS — M21.6X9 PLANTAR FLEXED METATARSAL, UNSPECIFIED LATERALITY: ICD-10-CM

## 2021-10-06 DIAGNOSIS — B35.1 ONYCHOMYCOSIS: ICD-10-CM

## 2021-10-06 DIAGNOSIS — M77.41 METATARSALGIA OF BOTH FEET: ICD-10-CM

## 2021-10-06 DIAGNOSIS — M20.41 HAMMERTOES OF BOTH FEET: Primary | ICD-10-CM

## 2021-10-06 DIAGNOSIS — M20.42 HAMMERTOES OF BOTH FEET: Primary | ICD-10-CM

## 2021-10-06 DIAGNOSIS — M77.42 METATARSALGIA OF BOTH FEET: ICD-10-CM

## 2021-10-06 DIAGNOSIS — L60.0 ONYCHOCRYPTOSIS: ICD-10-CM

## 2021-10-06 DIAGNOSIS — L84 PRE-ULCERATIVE CALLUSES: ICD-10-CM

## 2021-10-06 DIAGNOSIS — L60.3 ONYCHODYSTROPHY: ICD-10-CM

## 2021-10-06 PROCEDURE — 99213 OFFICE O/P EST LOW 20 MIN: CPT | Performed by: PODIATRIST

## 2021-11-28 NOTE — PROGRESS NOTES
Erika Swanson is a 80year old female. Patient presents with: Follow - Up: Fungal nail right second toe - Long nails - Hammertoes    HPI:     This 23-year-old female patient presents to clinic today for follow-up.   She has a dystrophic fungal nail on her r Activity      Alcohol use: No      Drug use: No    Other Topics      Concerns:        Caffeine Concern: Yes          coffee, occas.           REVIEW OF SYSTEMS:   Review of Systems  Today reviewed systens as documented below  GENERAL HEALTH: feels well othe right.    ASSESSMENT AND PLAN:   Diagnoses and all orders for this visit:    Hammertoes of both feet    Onychomycosis    Onychodystrophy    Onychocryptosis    Metatarsalgia of both feet    Plantar flexed metatarsal, unspecified laterality    Pre-ulcerative

## 2021-12-08 ENCOUNTER — OFFICE VISIT (OUTPATIENT)
Dept: PODIATRY CLINIC | Facility: CLINIC | Age: 71
End: 2021-12-08
Payer: MEDICARE

## 2021-12-08 DIAGNOSIS — M20.42 HAMMERTOES OF BOTH FEET: ICD-10-CM

## 2021-12-08 DIAGNOSIS — M20.41 HAMMERTOES OF BOTH FEET: ICD-10-CM

## 2021-12-08 DIAGNOSIS — M77.42 METATARSALGIA OF BOTH FEET: ICD-10-CM

## 2021-12-08 DIAGNOSIS — M77.41 METATARSALGIA OF BOTH FEET: ICD-10-CM

## 2021-12-08 DIAGNOSIS — B35.1 ONYCHOMYCOSIS: Primary | ICD-10-CM

## 2021-12-08 PROCEDURE — 99213 OFFICE O/P EST LOW 20 MIN: CPT | Performed by: STUDENT IN AN ORGANIZED HEALTH CARE EDUCATION/TRAINING PROGRAM

## 2021-12-11 NOTE — PROGRESS NOTES
Jerman Rodriguez is a 80year old female. Patient presents with: Follow - Up: Fungal nail right second toe - Long nails - Hammertoes    HPI:     Patient is a pleasant 67-year-old female patient presents to clinic today for follow-up.   She has a dystrophic fun Concern: Yes          coffee, occas.           REVIEW OF SYSTEMS:   Review of Systems  Today reviewed systens as documented below  GENERAL HEALTH: feels well otherwise  SKIN: denies any unusual skin lesions or rashes  RESPIRATORY: denies shortness of breath supportive shoe gear with wide toe box to accommodate deformities.  -Educated patient on acute signs of infection and advised patient to seek immediate medical attention if any concerns arise.     The patient indicates understanding of these issues and agre

## 2021-12-11 NOTE — PATIENT INSTRUCTIONS
-Continue regular foot checks and seek immediate medical attention if any concerns arise. Continue ambulating with supportive shoe gear with wide toe box to accommodate deformities.

## 2022-01-08 ENCOUNTER — LAB ENCOUNTER (OUTPATIENT)
Dept: LAB | Facility: HOSPITAL | Age: 72
End: 2022-01-08
Attending: INTERNAL MEDICINE
Payer: MEDICARE

## 2022-01-08 DIAGNOSIS — E78.2 MIXED HYPERLIPIDEMIA: ICD-10-CM

## 2022-01-08 DIAGNOSIS — E55.9 VITAMIN D DEFICIENCY: ICD-10-CM

## 2022-01-08 LAB
ALBUMIN SERPL-MCNC: 3.9 G/DL (ref 3.4–5)
ALBUMIN/GLOB SERPL: 1.1 {RATIO} (ref 1–2)
ALP LIVER SERPL-CCNC: 97 U/L
ALT SERPL-CCNC: 40 U/L
ANION GAP SERPL CALC-SCNC: 6 MMOL/L (ref 0–18)
AST SERPL-CCNC: 20 U/L (ref 15–37)
BASOPHILS # BLD AUTO: 0.14 X10(3) UL (ref 0–0.2)
BASOPHILS NFR BLD AUTO: 2.1 %
BILIRUB SERPL-MCNC: 0.6 MG/DL (ref 0.1–2)
BUN BLD-MCNC: 16 MG/DL (ref 7–18)
BUN/CREAT SERPL: 16.5 (ref 10–20)
CALCIUM BLD-MCNC: 9.5 MG/DL (ref 8.5–10.1)
CHLORIDE SERPL-SCNC: 104 MMOL/L (ref 98–112)
CHOLEST SERPL-MCNC: 189 MG/DL (ref ?–200)
CO2 SERPL-SCNC: 31 MMOL/L (ref 21–32)
CREAT BLD-MCNC: 0.97 MG/DL
DEPRECATED RDW RBC AUTO: 43.3 FL (ref 35.1–46.3)
EOSINOPHIL # BLD AUTO: 0.27 X10(3) UL (ref 0–0.7)
EOSINOPHIL NFR BLD AUTO: 4 %
ERYTHROCYTE [DISTWIDTH] IN BLOOD BY AUTOMATED COUNT: 13.4 % (ref 11–15)
FASTING PATIENT LIPID ANSWER: YES
FASTING STATUS PATIENT QL REPORTED: YES
GLOBULIN PLAS-MCNC: 3.4 G/DL (ref 2.8–4.4)
GLUCOSE BLD-MCNC: 86 MG/DL (ref 70–99)
HCT VFR BLD AUTO: 40.9 %
HDLC SERPL-MCNC: 68 MG/DL (ref 40–59)
HGB BLD-MCNC: 13.4 G/DL
IMM GRANULOCYTES # BLD AUTO: 0.02 X10(3) UL (ref 0–1)
IMM GRANULOCYTES NFR BLD: 0.3 %
LDLC SERPL CALC-MCNC: 104 MG/DL (ref ?–100)
LYMPHOCYTES # BLD AUTO: 1.9 X10(3) UL (ref 1–4)
LYMPHOCYTES NFR BLD AUTO: 27.9 %
MCH RBC QN AUTO: 28.5 PG (ref 26–34)
MCHC RBC AUTO-ENTMCNC: 32.8 G/DL (ref 31–37)
MCV RBC AUTO: 86.8 FL
MONOCYTES # BLD AUTO: 0.48 X10(3) UL (ref 0.1–1)
MONOCYTES NFR BLD AUTO: 7 %
NEUTROPHILS # BLD AUTO: 4.01 X10 (3) UL (ref 1.5–7.7)
NEUTROPHILS # BLD AUTO: 4.01 X10(3) UL (ref 1.5–7.7)
NEUTROPHILS NFR BLD AUTO: 58.7 %
NONHDLC SERPL-MCNC: 121 MG/DL (ref ?–130)
OSMOLALITY SERPL CALC.SUM OF ELEC: 292 MOSM/KG (ref 275–295)
PLATELET # BLD AUTO: 269 10(3)UL (ref 150–450)
POTASSIUM SERPL-SCNC: 4.3 MMOL/L (ref 3.5–5.1)
PROT SERPL-MCNC: 7.3 G/DL (ref 6.4–8.2)
RBC # BLD AUTO: 4.71 X10(6)UL
SODIUM SERPL-SCNC: 141 MMOL/L (ref 136–145)
TRIGL SERPL-MCNC: 93 MG/DL (ref 30–149)
TSI SER-ACNC: 0.44 MIU/ML (ref 0.36–3.74)
VIT D+METAB SERPL-MCNC: 29.7 NG/ML (ref 30–100)
VLDLC SERPL CALC-MCNC: 16 MG/DL (ref 0–30)
WBC # BLD AUTO: 6.8 X10(3) UL (ref 4–11)

## 2022-01-08 PROCEDURE — 84443 ASSAY THYROID STIM HORMONE: CPT

## 2022-01-08 PROCEDURE — 80053 COMPREHEN METABOLIC PANEL: CPT

## 2022-01-08 PROCEDURE — 82306 VITAMIN D 25 HYDROXY: CPT

## 2022-01-08 PROCEDURE — 36415 COLL VENOUS BLD VENIPUNCTURE: CPT

## 2022-01-08 PROCEDURE — 80061 LIPID PANEL: CPT

## 2022-01-08 PROCEDURE — 85025 COMPLETE CBC W/AUTO DIFF WBC: CPT

## 2022-01-11 ENCOUNTER — OFFICE VISIT (OUTPATIENT)
Dept: INTERNAL MEDICINE CLINIC | Facility: CLINIC | Age: 72
End: 2022-01-11
Payer: MEDICARE

## 2022-01-11 ENCOUNTER — TELEPHONE (OUTPATIENT)
Dept: INTERNAL MEDICINE CLINIC | Facility: CLINIC | Age: 72
End: 2022-01-11

## 2022-01-11 VITALS
TEMPERATURE: 98 F | RESPIRATION RATE: 18 BRPM | HEART RATE: 68 BPM | BODY MASS INDEX: 28 KG/M2 | HEIGHT: 64 IN | DIASTOLIC BLOOD PRESSURE: 80 MMHG | SYSTOLIC BLOOD PRESSURE: 124 MMHG | WEIGHT: 164 LBS

## 2022-01-11 DIAGNOSIS — E55.9 VITAMIN D DEFICIENCY: ICD-10-CM

## 2022-01-11 DIAGNOSIS — E78.2 MIXED HYPERLIPIDEMIA: ICD-10-CM

## 2022-01-11 DIAGNOSIS — M81.0 AGE-RELATED OSTEOPOROSIS WITHOUT CURRENT PATHOLOGICAL FRACTURE: ICD-10-CM

## 2022-01-11 DIAGNOSIS — E04.9 GOITER: Primary | ICD-10-CM

## 2022-01-11 DIAGNOSIS — K21.00 GASTROESOPHAGEAL REFLUX DISEASE WITH ESOPHAGITIS: ICD-10-CM

## 2022-01-11 PROCEDURE — 99214 OFFICE O/P EST MOD 30 MIN: CPT | Performed by: INTERNAL MEDICINE

## 2022-01-11 PROCEDURE — G0008 ADMIN INFLUENZA VIRUS VAC: HCPCS | Performed by: INTERNAL MEDICINE

## 2022-01-11 PROCEDURE — 90662 IIV NO PRSV INCREASED AG IM: CPT | Performed by: INTERNAL MEDICINE

## 2022-01-11 RX ORDER — SIMVASTATIN 10 MG
10 TABLET ORAL NIGHTLY
Qty: 90 TABLET | Refills: 1 | Status: SHIPPED | OUTPATIENT
Start: 2022-01-11

## 2022-01-11 RX ORDER — ERGOCALCIFEROL 1.25 MG/1
50000 CAPSULE ORAL WEEKLY
Qty: 12 CAPSULE | Refills: 1 | Status: SHIPPED | OUTPATIENT
Start: 2022-01-11 | End: 2022-02-10

## 2022-01-11 RX ORDER — OMEPRAZOLE 40 MG/1
40 CAPSULE, DELAYED RELEASE ORAL
Qty: 90 CAPSULE | Refills: 1 | Status: SHIPPED | OUTPATIENT
Start: 2022-01-11

## 2022-01-11 NOTE — ASSESSMENT & PLAN NOTE
Lipid panel liver function test have been stable on simvastatin at 10 mg daily. She has tolerated her medications well.   Continue the same dose of medication and recheck labs as ordered

## 2022-01-11 NOTE — ASSESSMENT & PLAN NOTE
Vitamin D levels are slightly low. Advised to continue on vitamin D but as a prescription 50,000 units once a week for the next 6 months.   Prescription provided today

## 2022-01-11 NOTE — PATIENT INSTRUCTIONS
Problem List Items Addressed This Visit        Unprioritized    Goiter - Primary     Us thyroid loked stable in 2019. No palpable abnormalities         Hyperlipidemia     Lipid panel liver function test have been stable on simvastatin at 10 mg daily. She has tolerated her medications well. Continue the same dose of medication and recheck labs as ordered         Relevant Medications    simvastatin 10 MG Oral Tab    Other Relevant Orders    CBC WITH DIFFERENTIAL WITH PLATELET    COMP METABOLIC PANEL (14)    LIPID PANEL    TSH W REFLEX TO FREE T4    Osteoporosis     DEXA scan last completed in 2019. T-scores had improved from -3.1 to -2.6. She has been on Prolia. Next due in about 1 to 2 weeks. Vitamin D deficiency     Vitamin D levels are slightly low. Advised to continue on vitamin D but as a prescription 50,000 units once a week for the next 6 months.   Prescription provided today         Relevant Medications    ergocalciferol 1.25 MG (14055 UT) Oral Cap    Other Relevant Orders    VITAMIN D      Other Visit Diagnoses     Gastroesophageal reflux disease with esophagitis        Relevant Medications    Omeprazole 40 MG Oral Capsule Delayed Release

## 2022-01-11 NOTE — TELEPHONE ENCOUNTER
Patient due for Prolia injection Jan 19, 2022, please obtain prior authorization and contact patient to schedule nurse visit.

## 2022-01-11 NOTE — ASSESSMENT & PLAN NOTE
DEXA scan last completed in 2019. T-scores had improved from -3.1 to -2.6. She has been on Prolia. Next due in about 1 to 2 weeks.

## 2022-01-18 NOTE — TELEPHONE ENCOUNTER
Please note that patient health plan does not require referrals. Please provider patient with an order. Thank you, Katharine Issa Specialist    [facility].

## 2022-01-20 NOTE — TELEPHONE ENCOUNTER
Prolia insurance verification and prior authorization form has been faxed to [employer] [phone].  It will take 7-10 business days for a response

## 2022-01-20 NOTE — TELEPHONE ENCOUNTER
Triage support: please double check if another PA is needed? Last RX for Prolia dated 5/27/2021. PA was submitted. Last dose given 7/19/21. Next due in 6 months. Patient is now due this month.

## 2022-01-20 NOTE — TELEPHONE ENCOUNTER
Patient is calling back to follow up on the status of the prolio authorization. Please call patient back with update at [phone].

## 2022-01-20 NOTE — TELEPHONE ENCOUNTER
Patient advised that her authorization request was submitted today and will likely take 7-10 business days for approval.  She will be contacted to schedule for the shot once approval is obtained.

## 2022-01-27 NOTE — TELEPHONE ENCOUNTER
Last Prolia injection done 07/19/2021. Please assist patient with scheduling appointment. Patient is due any time now.

## 2022-02-04 ENCOUNTER — NURSE ONLY (OUTPATIENT)
Dept: INTERNAL MEDICINE CLINIC | Facility: CLINIC | Age: 72
End: 2022-02-04
Payer: MEDICARE

## 2022-02-04 DIAGNOSIS — M81.0 AGE-RELATED OSTEOPOROSIS WITHOUT CURRENT PATHOLOGICAL FRACTURE: Primary | ICD-10-CM

## 2022-02-04 PROCEDURE — 96372 THER/PROPH/DIAG INJ SC/IM: CPT | Performed by: INTERNAL MEDICINE

## 2022-03-17 ENCOUNTER — TELEPHONE (OUTPATIENT)
Dept: FAMILY MEDICINE CLINIC | Facility: CLINIC | Age: 72
End: 2022-03-17

## 2022-03-17 DIAGNOSIS — Z12.11 COLON CANCER SCREENING: Primary | ICD-10-CM

## (undated) NOTE — LETTER
12/13/18        [address]  [id number] [id number]      Dear Gopi Flores,    [facility] records indicate that you have outstanding lab work and or testing that was ordered for you and has not yet been completed:  Orders Placed This Encounter      CBC W Dif

## (undated) NOTE — MR AVS SNAPSHOT
[address] [address]  [phone]               Thank you for choosing us for your health care visit with Fernandez Brumfield MD.  We are glad to serve you and happy to provide you with this summary BP Pulse Temp Height Weight BMI    94/54 mmHg 71 97.5 °F (36.4 °C) (Oral) 5' 6\" (1.676 m) 168 lb 14.4 oz (76.613 kg) 27.27 kg/m2    Breastfeeding?                    No              Current Medications          This list is accurate as of: 1/26/17  2:02 P your Zip Code and Date of Birth to complete the sign-up process. If you do not sign up before the expiration date, you must request a new code.     Your unique [employer] Access Code: [email or web address]  Expires: 3/27/2017  2:02 PM    If you have questions, you can c